# Patient Record
Sex: FEMALE | Race: BLACK OR AFRICAN AMERICAN | Employment: FULL TIME | URBAN - METROPOLITAN AREA
[De-identification: names, ages, dates, MRNs, and addresses within clinical notes are randomized per-mention and may not be internally consistent; named-entity substitution may affect disease eponyms.]

---

## 2021-06-25 PROBLEM — H47.10 OPTIC DISC EDEMA: Status: RESOLVED | Noted: 2019-05-30 | Resolved: 2021-06-25

## 2021-06-25 PROBLEM — B02.8 HERPES ZOSTER WITH COMPLICATION: Status: ACTIVE | Noted: 2019-03-05

## 2021-06-25 PROBLEM — B02.8 HERPES ZOSTER WITH COMPLICATION: Status: RESOLVED | Noted: 2019-03-05 | Resolved: 2021-06-25

## 2021-06-25 PROBLEM — H47.10 OPTIC DISC EDEMA: Status: ACTIVE | Noted: 2019-05-30

## 2022-02-08 PROBLEM — E61.1 IRON DEFICIENCY: Status: ACTIVE | Noted: 2022-02-08

## 2022-02-08 PROBLEM — E03.9 HYPOTHYROIDISM, UNSPECIFIED TYPE: Status: ACTIVE | Noted: 2022-02-08

## 2022-03-02 ENCOUNTER — LAB REQUISITION (OUTPATIENT)
Dept: LAB | Facility: HOSPITAL | Age: 32
End: 2022-03-02
Payer: COMMERCIAL

## 2022-03-02 PROCEDURE — 88305 TISSUE EXAM BY PATHOLOGIST: CPT | Performed by: OBSTETRICS & GYNECOLOGY

## 2023-10-12 ENCOUNTER — OFFICE VISIT (OUTPATIENT)
Dept: FAMILY MEDICINE CLINIC | Facility: CLINIC | Age: 33
End: 2023-10-12
Payer: COMMERCIAL

## 2023-10-12 VITALS
HEIGHT: 70 IN | TEMPERATURE: 98 F | WEIGHT: 186 LBS | DIASTOLIC BLOOD PRESSURE: 74 MMHG | HEART RATE: 85 BPM | BODY MASS INDEX: 26.63 KG/M2 | OXYGEN SATURATION: 97 % | SYSTOLIC BLOOD PRESSURE: 110 MMHG

## 2023-10-12 DIAGNOSIS — R82.998 DARK URINE: ICD-10-CM

## 2023-10-12 DIAGNOSIS — F41.9 ANXIETY: Primary | ICD-10-CM

## 2023-10-12 DIAGNOSIS — E61.1 IRON DEFICIENCY: ICD-10-CM

## 2023-10-12 DIAGNOSIS — Z11.59 NEED FOR HEPATITIS C SCREENING TEST: ICD-10-CM

## 2023-10-12 DIAGNOSIS — R53.83 OTHER FATIGUE: ICD-10-CM

## 2023-10-12 DIAGNOSIS — E03.8 OTHER SPECIFIED HYPOTHYROIDISM: ICD-10-CM

## 2023-10-12 PROBLEM — E03.9 HYPOTHYROIDISM: Status: ACTIVE | Noted: 2022-02-08

## 2023-10-12 PROBLEM — H47.10 OPTIC DISC EDEMA: Status: ACTIVE | Noted: 2019-05-30

## 2023-10-12 PROBLEM — N84.0 ENDOMETRIAL POLYP: Status: ACTIVE | Noted: 2023-10-12

## 2023-10-12 PROBLEM — B02.8 HERPES ZOSTER WITH COMPLICATION: Status: ACTIVE | Noted: 2019-03-05

## 2023-10-12 LAB
BILIRUB BLD-MCNC: NEGATIVE MG/DL
CLARITY, POC: CLEAR
COLOR UR: ABNORMAL
EXPIRATION DATE: ABNORMAL
GLUCOSE UR STRIP-MCNC: NEGATIVE MG/DL
KETONES UR QL: ABNORMAL
LEUKOCYTE EST, POC: NEGATIVE
Lab: ABNORMAL
NITRITE UR-MCNC: NEGATIVE MG/ML
PH UR: 7 [PH] (ref 5–8)
PROT UR STRIP-MCNC: ABNORMAL MG/DL
RBC # UR STRIP: NEGATIVE /UL
SP GR UR: 1.03 (ref 1–1.03)
UROBILINOGEN UR QL: ABNORMAL

## 2023-10-12 RX ORDER — FLUOXETINE HYDROCHLORIDE 20 MG/1
20 CAPSULE ORAL EVERY MORNING
Qty: 30 CAPSULE | Refills: 0 | Status: SHIPPED | OUTPATIENT
Start: 2023-10-12

## 2023-10-12 RX ORDER — DIPHENOXYLATE HYDROCHLORIDE AND ATROPINE SULFATE 2.5; .025 MG/1; MG/1
1 TABLET ORAL
COMMUNITY

## 2023-10-12 NOTE — ASSESSMENT & PLAN NOTE
- Not currently with SI/HI.    - Strongly advised starting counseling.    - Pharmacologic therapy: prozac 20mg every morning, discussed side effects, and advised continuing to use med if these develop as they will often resolve within 2 weeks.    - Provided information on nonpharmacologic coping strategies.  - Discussed going to ER immediately if SI/HI develop.   - To RTC in 4 weeks, sooner prn.

## 2023-10-12 NOTE — PATIENT INSTRUCTIONS
Today: Update screening labs    Meds: start prozac 20mg every morning    Referrals: none    Imaging: none    Healthy lifestyle tips  - Reduce intake of processed food (shop perimeter of grocery store), especially white flour and sugar  - Increase intake of fruits/veggies  - Drink 32-64oz of water a day  - Quit smoking if you smoke  - Drink no more than 2 alcoholic beverages/day if you are male, no more than 1 alcoholic beverage/day if you are female  - Get 6-8 hours of restful sleep at night- poor sleep quality has been linked to increased risk of cardiovascular disease  - Voluntary physical activity cumulative 120-150 minutes/week  - Stress management utilizing meditation and mindfulness (InsightTimer, free karthik)  - Keep blood pressure < 140/90    Heart healthy diet from AHA: https://www.heart.org/en/healthy-living/healthy-eating/eat-smart/nutrition-basics/aha-diet-and-lifestyle-recommendations    MH plan:  - Contact insurance for in network counseling  - Meditation: check out Insight Timer (free karthik)  - Sleep hygiene  - Increase physical activity as tolerated- goal 120mins/week  - Acupressure  - Supplements: magnesium, ashwaganda  - May make appt for Dr. Dumas's acupuncture clinic if desired- Tuesdays, $100 per visit  - If you experience any thoughts of harming yourself or someone else, please go to the ER immediately.     Resources:  https://www.apa.org/topics/anxiety/    https://www.apa.org/topics/depression/    https://sleepeducation.org/healthy-sleep/healthy-sleep-habits/    https://www.[facility].org/cancer-care/patient-education/acupressure-stress-and-anxiety    Sleep/fatigue plan:  - Keep a consistent sleep schedule. Get up at the same time every day, even on weekends or during vacations.  - Set a bedtime that is early enough for you to get at least 7-8 hours of sleep.  - Don't go to bed unless you are sleepy.  - If you don't fall asleep after 20 minutes, get out of bed. Go do a quiet activity without a lot of  light exposure. It is especially important to not get on electronics.  - Establish a relaxing bedtime routine.  - Use your bed only for sleep and sex.  - Make your bedroom quiet and relaxing. Keep the room at a comfortable, cool temperature.  - Limit exposure to bright light in the evenings.  - Turn off electronic devices at least 30 minutes before bedtime.  - Don't eat a large meal before bedtime. If you are hungry at night, eat a light, healthy snack.  - Exercise regularly and maintain a healthy diet.  - Avoid consuming caffeine in the afternoon or evening.  - Avoid consuming alcohol before bedtime.  - Reduce your fluid intake before bedtime.  https://sleepeducation.org/healthy-sleep/healthy-sleep-habits/

## 2023-10-12 NOTE — PROGRESS NOTES
Chief Complaint  Chief Complaint   Patient presents with    Establish Care       Subjective    History of Present Illness  Pb Castellanos is a 32 y.o. female presents to [facility] to establish care.  Occupation:  for [employer]  Hobbies: paint, reading, spend time with 10 yo, go on dates  Diet: Fruits and veggies this week  Physical activity: Jog sometimes, lifting weights  Support system: God  Sleep: Awful- trouble falling asleep and staying asleep x years. Feels like she can't turn her brain off. Can use meditation to fall asleep, but then will wake up overnight and can't go back to sleep. She has tried trazodone- worked initially, but then started to give her migraine.   Mood: positive person, but is also exhausted and feeling mentally drained  Health goals: Getting better sleep, eat better    Current Outpatient Medications on File Prior to Visit   Medication Sig Dispense Refill    multivitamin tablet tablet Take 1 tablet by mouth.       No current facility-administered medications on file prior to visit.       Past Medical History:   Diagnosis Date    Anemia     Hypothyroidism        Past Surgical History:   Procedure Laterality Date    CERVICAL CERCLAGE      D & C HYSTEROSCOPY      HYSTEROSCOPY W/ POLYPECTOMY         Family History   Problem Relation Age of Onset    Hypertension Mother     Drug abuse Mother 48             No Known Problems Father     Thyroid disease Maternal Aunt     Heart attack Maternal Great-Grandfather        Health Maintenance Summary            Overdue - BMI FOLLOWUP (Yearly) Never done      No completion, postpone, or frequency change history exists for this topic.              Ordered - HEPATITIS C SCREENING (Once) Ordered on 10/12/2023      No completion, postpone, or frequency change history exists for this topic.              Postponed - PAP SMEAR (Every 3 Years) Postponed until 2023      10/12/2023  Postponed  "until 12/4/2023 by Alisa Dumas MD (Pending event)              ANNUAL PHYSICAL (Yearly) Next due on 10/12/2024      10/12/2023  Done              TDAP/TD VACCINES (2 - Td or Tdap) Next due on 2/15/2029      02/15/2019  Imm Admin: Tdap              Pneumococcal Vaccine 0-64 (Series Information) Aged Out      No completion, postpone, or frequency change history exists for this topic.              Discontinued - COVID-19 Vaccine  Discontinued      10/12/2023  Frequency changed to Never by Alisa Dumas MD (patient never wants)              Discontinued - INFLUENZA VACCINE  Discontinued      10/12/2023  Frequency changed to Never by Alisa Dumas MD (pt never will get)                       Objective   Vitals:    10/12/23 1433   BP: 110/74   BP Location: Left arm   Patient Position: Sitting   Cuff Size: Large Adult   Pulse: 85   Temp: 98 øF (36.7 øC)   TempSrc: Oral   SpO2: 97%   Weight: 84.4 kg (186 lb)   Height: 177.8 cm (70\")        BMI is >= 25 and <30. (Overweight) The following options were offered after discussion;: exercise counseling/recommendations and nutrition counseling/recommendations       Physical Exam  Vitals reviewed.   Constitutional:       General: She is not in acute distress.     Appearance: Normal appearance.   HENT:      Head: Normocephalic and atraumatic.      Right Ear: Tympanic membrane, ear canal and external ear normal.      Left Ear: Tympanic membrane, ear canal and external ear normal.      Nose: Nose normal. No rhinorrhea.      Mouth/Throat:      Mouth: Mucous membranes are moist.      Pharynx: No posterior oropharyngeal erythema.      Comments: Enlarged tonsils bilaterally  Eyes:      Extraocular Movements: Extraocular movements intact.      Conjunctiva/sclera: Conjunctivae normal.      Pupils: Pupils are equal, round, and reactive to light.   Neck:      Comments: No thyromegaly or thyroid nodule on palpation  Cardiovascular:      Rate and Rhythm: Normal rate and regular rhythm.     "  Pulses: Normal pulses.      Heart sounds: Normal heart sounds. No murmur heard.  Pulmonary:      Effort: Pulmonary effort is normal.      Breath sounds: Normal breath sounds. No wheezing.   Abdominal:      General: Bowel sounds are normal. There is no distension.      Palpations: Abdomen is soft.      Tenderness: There is no abdominal tenderness.   Musculoskeletal:         General: No tenderness or deformity. Normal range of motion.      Cervical back: Normal range of motion.   Lymphadenopathy:      Cervical: No cervical adenopathy.   Skin:     General: Skin is warm and dry.      Capillary Refill: Capillary refill takes less than 2 seconds.      Findings: No lesion or rash.   Neurological:      General: No focal deficit present.      Mental Status: She is alert and oriented to person, place, and time.      Gait: Gait normal.      Deep Tendon Reflexes: Reflexes abnormal (patellar reflex 0-1+).   Psychiatric:         Mood and Affect: Mood normal.         Behavior: Behavior normal.         Judgment: Judgment normal.        Brief Urine Lab Results  (Last result in the past 365 days)        Color   Clarity   Blood   Leuk Est   Nitrite   Protein   CREAT   Urine HCG        10/12/23 1527 Dark Yellow   Clear   Negative   Negative   Negative   30 mg/dL                   PHQ-9 Depression Screening  Little interest or pleasure in doing things? 0-->not at all   Feeling down, depressed, or hopeless? 0-->not at all   Trouble falling or staying asleep, or sleeping too much?     Feeling tired or having little energy?     Poor appetite or overeating?     Feeling bad about yourself - or that you are a failure or have let yourself or your family down?     Trouble concentrating on things, such as reading the newspaper or watching television?     Moving or speaking so slowly that other people could have noticed? Or the opposite - being so fidgety or restless that you have been moving around a lot more than usual?     Thoughts that you  "would be better off dead, or of hurting yourself in some way?     PHQ-9 Total Score 0   If you checked off any problems, how difficult have these problems made it for you to do your work, take care of things at home, or get along with other people?       CURTIS-7 (General Anxiety Disorder-7) from [employer]  on 10/12/2023  ** All calculations should be rechecked by clinician prior to use **    RESULT SUMMARY:  19 points  Severe anxiety disorder.    Functionally, the patient finds it is "very difficult" to perform life tasks due to their symptoms.      INPUTS:  Feeling nervous, anxious, or on edge --> 3 = Nearly every day  Not being able to stop or control worrying --> 3 = Nearly every day  Worrying too much about different things --> 3 = Nearly every day  Trouble relaxing --> 3 = Nearly every day  Being so restless that it's hard to sit still --> 3 = Nearly every day  Becoming easily annoyed or irritable --> 1 = Several days  Feeling afraid as if something awful might happen --> 3 = Nearly every day  Ask the patient: how difficult have these problems made it to do work, take care of things at home, or get along with other people? --> 2 = Very difficult      The following data was reviewed by: Alisa Dumas MD on 10/12/2023:  Labs: 2022 iron, ferritin, B12, folate, CBC, TSH  Last PCP clinic note 2022    Assessment and Plan  Pb Castellanos is a 32 y.o. female presents to [facility] today to establish care, chart reviewed and updated, medications reviewed, labs reviewed, preventative med reviewed. Answered all questions at this time, pt expressed no further concerns today.     Preventative medicine:   Eye exam: Up to date.    Dental exam: Up to date.    BP: normal  Lipid Panel :   ordered     A1c:  ordered      Hep C screening: ordered  HIV Screen UTD - pt declined  STI screening: pt declined  Colon cancer screening : N/A- age 45  Lung cancer screening: N/A  Immunizations UTD: No- pt " declined flu and covid vaccination  Anxiety/depression screening: abnormal see plan of care below  Tobacco cessation counseling: current occasional hookah use    Women:   Pap: Not up to date.  Schedule in 4 weeks  Mammogram:  age 41    Osteoporosis Screen:  age 66      Diagnoses and all orders for this visit:    1. Anxiety (Primary)  Assessment & Plan:  - Not currently with SI/HI.    - Strongly advised starting counseling.    - Pharmacologic therapy: prozac 20mg every morning, discussed side effects, and advised continuing to use med if these develop as they will often resolve within 2 weeks.    - Provided information on nonpharmacologic coping strategies.  - Discussed going to ER immediately if SI/HI develop.   - To RTC in 4 weeks, sooner prn.      Orders:  -     TSH Rfx On Abnormal To Free T4  -     FLUoxetine (PROzac) 20 MG capsule; Take 1 capsule by mouth Every Morning.  Dispense: 30 capsule; Refill: 0    2. Other fatigue  Assessment & Plan:  - fatigue labs pending  - optimize anxiety control with pharmacotherapy  - encouraged sleep hygiene  - if normal labs, no improvement with anxiety control and sleep hygiene will ref to sleep med for eval    Orders:  -     CBC & Differential  -     Comprehensive Metabolic Panel  -     Lipid Panel  -     Hemoglobin A1c  -     Vitamin D,25-Hydroxy  -     Ferritin    3. Dark urine  Comments:  + ketones/protein on clinic UA. Kidney bloodwork pending.  Orders:  -     Comprehensive Metabolic Panel  -     Hemoglobin A1c  -     POC Urinalysis Dipstick, Automated  -     Cancel: Urine Culture - Urine, Urine, Clean Catch    4. Other specified hypothyroidism  Assessment & Plan:  - TSH pending, if abnormal will restart synthroid        Orders:  -     TSH Rfx On Abnormal To Free T4    5. Iron deficiency  Assessment & Plan:  Labs pending, last ferritin 19 in 2022    Orders:  -     CBC & Differential  -     Ferritin    6. Need for hepatitis C screening test  -     Hepatitis C  Antibody        Patient voiced understanding and agreement with plan of care and had no further questions or concerns at this time.     I spent 51 minutes on this encounter, including chart review of any relevant previous encounters, labs, and imaging;  >%50% of encounter spent face-to-face with the patient or coordinating care.    Alisa Dumas MD  Family Medicine  [facility]    Follow Up  Return in about 4 weeks (around 11/9/2023) for Recheck and pap.    Patient Instructions   Today: Update screening labs    Meds: start prozac 20mg every morning    Referrals: none    Imaging: none    Healthy lifestyle tips  - Reduce intake of processed food (shop perimeter of grocery store), especially white flour and sugar  - Increase intake of fruits/veggies  - Drink 32-64oz of water a day  - Quit smoking if you smoke  - Drink no more than 2 alcoholic beverages/day if you are male, no more than 1 alcoholic beverage/day if you are female  - Get 6-8 hours of restful sleep at night- poor sleep quality has been linked to increased risk of cardiovascular disease  - Voluntary physical activity cumulative 120-150 minutes/week  - Stress management utilizing meditation and mindfulness (InsightTimer, free karthik)  - Keep blood pressure < 140/90    Heart healthy diet from AHA: https://www.heart.org/en/healthy-living/healthy-eating/eat-smart/nutrition-basics/aha-diet-and-lifestyle-recommendations    MH plan:  - Contact insurance for in network counseling  - Meditation: check out Insight Timer (free karthik)  - Sleep hygiene  - Increase physical activity as tolerated- goal 120mins/week  - Acupressure  - Supplements: magnesium, ashwaganda  - May make appt for Dr. Dumas's acupuncture clinic if desired- Tuesdays, $100 per visit  - If you experience any thoughts of harming yourself or someone else, please go to the ER immediately.      Resources:  https://www.apa.org/topics/anxiety/    https://www.apa.org/topics/depression/    https://sleepeducation.org/healthy-sleep/healthy-sleep-habits/    https://www.[facility] [place].org/cancer-care/patient-education/acupressure-stress-and-anxiety    Sleep/fatigue plan:  - Keep a consistent sleep schedule. Get up at the same time every day, even on weekends or during vacations.  - Set a bedtime that is early enough for you to get at least 7-8 hours of sleep.  - Don't go to bed unless you are sleepy.  - If you don't fall asleep after 20 minutes, get out of bed. Go do a quiet activity without a lot of light exposure. It is especially important to not get on electronics.  - Establish a relaxing bedtime routine.  - Use your bed only for sleep and sex.  - Make your bedroom quiet and relaxing. Keep the room at a comfortable, cool temperature.  - Limit exposure to bright light in the evenings.  - Turn off electronic devices at least 30 minutes before bedtime.  - Don't eat a large meal before bedtime. If you are hungry at night, eat a light, healthy snack.  - Exercise regularly and maintain a healthy diet.  - Avoid consuming caffeine in the afternoon or evening.  - Avoid consuming alcohol before bedtime.  - Reduce your fluid intake before bedtime.  https://sleepeducation.org/healthy-sleep/healthy-sleep-habits/

## 2023-10-12 NOTE — ASSESSMENT & PLAN NOTE
- fatigue labs pending  - optimize anxiety control with pharmacotherapy  - encouraged sleep hygiene  - if normal labs, no improvement with anxiety control and sleep hygiene will ref to sleep med for eval

## 2023-10-13 ENCOUNTER — TELEPHONE (OUTPATIENT)
Dept: FAMILY MEDICINE CLINIC | Facility: CLINIC | Age: 33
End: 2023-10-13
Payer: COMMERCIAL

## 2023-10-13 ENCOUNTER — PATIENT MESSAGE (OUTPATIENT)
Dept: FAMILY MEDICINE CLINIC | Facility: CLINIC | Age: 33
End: 2023-10-13
Payer: COMMERCIAL

## 2023-10-13 DIAGNOSIS — E03.8 OTHER SPECIFIED HYPOTHYROIDISM: Primary | ICD-10-CM

## 2023-10-13 DIAGNOSIS — E03.8 OTHER SPECIFIED HYPOTHYROIDISM: ICD-10-CM

## 2023-10-13 LAB
25(OH)D3+25(OH)D2 SERPL-MCNC: 33.1 NG/ML (ref 30–100)
ALBUMIN SERPL-MCNC: 4.6 G/DL (ref 3.9–4.9)
ALBUMIN/GLOB SERPL: 1.4 {RATIO} (ref 1.2–2.2)
ALP SERPL-CCNC: 64 IU/L (ref 44–121)
ALT SERPL-CCNC: 11 IU/L (ref 0–32)
AST SERPL-CCNC: 26 IU/L (ref 0–40)
BASOPHILS # BLD AUTO: 0 X10E3/UL (ref 0–0.2)
BASOPHILS NFR BLD AUTO: 1 %
BILIRUB SERPL-MCNC: 0.4 MG/DL (ref 0–1.2)
BUN SERPL-MCNC: 8 MG/DL (ref 6–20)
BUN/CREAT SERPL: 8 (ref 9–23)
CALCIUM SERPL-MCNC: 9.7 MG/DL (ref 8.7–10.2)
CHLORIDE SERPL-SCNC: 99 MMOL/L (ref 96–106)
CHOLEST SERPL-MCNC: 225 MG/DL (ref 100–199)
CO2 SERPL-SCNC: 22 MMOL/L (ref 20–29)
CREAT SERPL-MCNC: 0.95 MG/DL (ref 0.57–1)
EGFRCR SERPLBLD CKD-EPI 2021: 82 ML/MIN/1.73
EOSINOPHIL # BLD AUTO: 0.1 X10E3/UL (ref 0–0.4)
EOSINOPHIL NFR BLD AUTO: 1 %
ERYTHROCYTE [DISTWIDTH] IN BLOOD BY AUTOMATED COUNT: 13.6 % (ref 11.7–15.4)
FERRITIN SERPL-MCNC: 17 NG/ML (ref 15–150)
GLOBULIN SER CALC-MCNC: 3.4 G/DL (ref 1.5–4.5)
GLUCOSE SERPL-MCNC: 78 MG/DL (ref 70–99)
HBA1C MFR BLD: 5.9 % (ref 4.8–5.6)
HCT VFR BLD AUTO: 33.9 % (ref 34–46.6)
HCV IGG SERPL QL IA: NON REACTIVE
HDLC SERPL-MCNC: 82 MG/DL
HGB BLD-MCNC: 11 G/DL (ref 11.1–15.9)
IMM GRANULOCYTES # BLD AUTO: 0 X10E3/UL (ref 0–0.1)
IMM GRANULOCYTES NFR BLD AUTO: 0 %
LDLC SERPL CALC-MCNC: 135 MG/DL (ref 0–99)
LYMPHOCYTES # BLD AUTO: 2.4 X10E3/UL (ref 0.7–3.1)
LYMPHOCYTES NFR BLD AUTO: 38 %
MCH RBC QN AUTO: 29.6 PG (ref 26.6–33)
MCHC RBC AUTO-ENTMCNC: 32.4 G/DL (ref 31.5–35.7)
MCV RBC AUTO: 91 FL (ref 79–97)
MONOCYTES # BLD AUTO: 0.6 X10E3/UL (ref 0.1–0.9)
MONOCYTES NFR BLD AUTO: 10 %
NEUTROPHILS # BLD AUTO: 3.1 X10E3/UL (ref 1.4–7)
NEUTROPHILS NFR BLD AUTO: 50 %
PLATELET # BLD AUTO: 254 X10E3/UL (ref 150–450)
POTASSIUM SERPL-SCNC: 4.2 MMOL/L (ref 3.5–5.2)
PROT SERPL-MCNC: 8 G/DL (ref 6–8.5)
RBC # BLD AUTO: 3.72 X10E6/UL (ref 3.77–5.28)
SODIUM SERPL-SCNC: 137 MMOL/L (ref 134–144)
T4 FREE SERPL-MCNC: 0.81 NG/DL (ref 0.82–1.77)
TRIGL SERPL-MCNC: 48 MG/DL (ref 0–149)
TSH SERPL DL<=0.005 MIU/L-ACNC: 16 UIU/ML (ref 0.45–4.5)
VLDLC SERPL CALC-MCNC: 8 MG/DL (ref 5–40)
WBC # BLD AUTO: 6.2 X10E3/UL (ref 3.4–10.8)

## 2023-10-13 RX ORDER — LEVOTHYROXINE SODIUM 88 UG/1
88 TABLET ORAL DAILY
Qty: 30 TABLET | Refills: 0 | Status: SHIPPED | OUTPATIENT
Start: 2023-10-13

## 2023-10-13 NOTE — PROGRESS NOTES
Hello!    Here are the results of your most recent labs:    Your Hep C antibody, Vitamin D, and Comprehensive Metabolic Panel was all normal.     Your iron level, CBC, Cholesterol, Hgb A1C, and Thyroid Function was abnormal.     You are still pretty anemic based on the low hemoglobin/hematocrit/RBC and ferritin at 17- goal ferritin is 50-75 minimum. I recommend oral iron supplementation for 12-16 weeks. You can get OTC iron 325mg (the bottle may say 65mg), any brand is ok, and take one every day. I recommend taking iron with a vitamin C containing beverage such as orange juice to enhance absorption. I fpossible, avoid dairy, Tums, and coffee/tea around when you take iron as they may decrease absorption. Common side effects of iron supplementation include some stomach upset and constipation. Be sure to drink enough water and eat enough fiber when taking iron, you may use miralax if needed for constipation. Your poop may be dark green while taking iron, that is normal. You should notice an improvement within 6-8 weeks of daily iron supplementation as it takes a bit for the body to rebuild the stores it needs. If you do not notice an improvement, please let me know and we can repeat labs.      Your cholesterol was elevated.  For diet and lifestyle intervention, I recommend decreasing intake of trans and saturated fats, and red meat.  Increase physical activity as tolerated.  You may try supplementing with omega-3, berberine, and/or flaxseed if desired.    Your A1c was elevated, it is in the prediabetic range.  For diet and lifestyle interventions I recommend decreasing carbohydrate intake to 100-150 g/day, reducing processed food, increasing fruit and vegetable intake, at least 120 minutes of physical activity per week as tolerated, and controlling blood pressure with a goal of less than 120/80.     I'd like to repeat your CBC, A1c, and lipid in 3 months to assess response to diet and lifestyle changes. I have ordered  those labs to be drawn at our [place] lab at your convenience.    Your thyroid function was not normal, I think it would be good to restart your thyroid medicine. I'd like to start at 88mcg and then repeat your TSH in 4 weeks to assess response, will order at your followup appointment.. Be sure to take your thyroid med first thing in the morning on an empty stomach with a full glass of water. Wait 30-60 minutes before ingesting anything else to maximize absorption of the thyroid medicine.     Please contact me with any questions.    Thank you!  Dr. Dumas

## 2023-10-13 NOTE — TELEPHONE ENCOUNTER
Caller: Pb Castellanos    Relationship: Self    Best call back number: [phone]    What is the best time to reach you: ANYTIME     Who are you requesting to speak with (clinical staff, provider,  specific staff member): CLINICAL     What was the call regarding: PATIENT STATES THE PHARMACY HAS CALLED HER TWICE TODAY IN REGARDS TO NEEDING A PRIOR AUTHORIZATION FOR THE LEVOTHYROXINE.     PATIENT STATES SHE IS WANTING TO KNOW IF THERE IS AN UPDATE ON THE AUTHORIZATION.     PATIENT IS REQUESTING A CALL BACK.

## 2023-10-16 RX ORDER — FERROUS SULFATE 325(65) MG
325 TABLET ORAL
Qty: 90 TABLET | Refills: 1 | Status: SHIPPED | OUTPATIENT
Start: 2023-10-16

## 2023-10-16 NOTE — TELEPHONE ENCOUNTER
Luisito Mendoza 10/16/2023 9:53 AM EDT      ----- Message -----  From: Pb Castellanos  Sent: 10/16/2023 9:52 AM EDT  To: [facility]  Subject: Test results     Hey can you send me in a RX for iron please?

## 2023-11-12 DIAGNOSIS — E03.8 OTHER SPECIFIED HYPOTHYROIDISM: ICD-10-CM

## 2023-11-14 DIAGNOSIS — E03.8 OTHER SPECIFIED HYPOTHYROIDISM: ICD-10-CM

## 2023-11-14 DIAGNOSIS — E03.8 OTHER SPECIFIED HYPOTHYROIDISM: Primary | ICD-10-CM

## 2023-11-14 RX ORDER — LEVOTHYROXINE SODIUM 88 UG/1
TABLET ORAL
Qty: 90 TABLET | OUTPATIENT
Start: 2023-11-14

## 2023-11-14 RX ORDER — LEVOTHYROXINE SODIUM 88 UG/1
88 TABLET ORAL DAILY
Qty: 30 TABLET | Refills: 0 | Status: SHIPPED | OUTPATIENT
Start: 2023-11-14 | End: 2023-11-15

## 2023-11-14 NOTE — TELEPHONE ENCOUNTER
Rx Refill Note  Requested Prescriptions     Pending Prescriptions Disp Refills    levothyroxine (Synthroid) 88 MCG tablet 30 tablet 0     Sig: Take 1 tablet by mouth Daily. Take first thing in morning on an empty stomach with a full glass of water, wait 30-60 minutes before ingesting anything else to maximize medication absorption  Indications: Underactive Thyroid      Last office visit with prescribing clinician: 10/12/2023   Last telemedicine visit with prescribing clinician: Visit date not found   Next office visit with prescribing clinician: Visit date not found     Katerine Strong MA  11/14/23, 13:09 EST

## 2023-11-15 RX ORDER — LEVOTHYROXINE SODIUM 88 UG/1
TABLET ORAL
Qty: 90 TABLET | Refills: 0 | Status: SHIPPED | OUTPATIENT
Start: 2023-11-15

## 2023-11-18 DIAGNOSIS — F41.9 ANXIETY: ICD-10-CM

## 2023-11-20 RX ORDER — FLUOXETINE HYDROCHLORIDE 20 MG/1
20 CAPSULE ORAL EVERY MORNING
Qty: 30 CAPSULE | Refills: 0 | Status: SHIPPED | OUTPATIENT
Start: 2023-11-20

## 2023-12-18 DIAGNOSIS — E03.8 OTHER SPECIFIED HYPOTHYROIDISM: ICD-10-CM

## 2023-12-18 RX ORDER — LEVOTHYROXINE SODIUM 88 UG/1
88 TABLET ORAL
Qty: 90 TABLET | Refills: 0 | Status: SHIPPED | OUTPATIENT
Start: 2023-12-18

## 2023-12-18 NOTE — TELEPHONE ENCOUNTER
Rx Refill Note  Requested Prescriptions     Pending Prescriptions Disp Refills    levothyroxine (SYNTHROID, LEVOTHROID) 88 MCG tablet 90 tablet 0      Last office visit with prescribing clinician: 10/12/2023   Last telemedicine visit with prescribing clinician: Visit date not found   Next office visit with prescribing clinician: Visit date not found     Katerine Strong MA  12/18/23, 13:11 EST

## 2023-12-18 NOTE — TELEPHONE ENCOUNTER
Caller: CastellanosPb    Relationship: Self    Best call back number: [phone]     Requested Prescriptions:   Requested Prescriptions     Pending Prescriptions Disp Refills    levothyroxine (SYNTHROID, LEVOTHROID) 88 MCG tablet 90 tablet 0        Pharmacy where request should be sent: [id number] [id number] AT [facility] [phone] [facility] [phone]      Last office visit with prescribing clinician: 10/12/2023   Last telemedicine visit with prescribing clinician: Visit date not found   Next office visit with prescribing clinician: Visit date not found     Additional details provided by patient: PATIENT STATED THAT SHE HAS ONE LEFT. PATIENT STATED THAT SHE WILL SCHEDULE AN APPOINTMENT FOR BLOOD WORK IN JANUARY AND IS REQUESTING ONE MORE REFILL TO LAST UNTIL THEN SO SHE IS NOT WITHOUT MEDICATION. PLEASE ADVISE.     Does the patient have less than a 3 day supply:  [x] Yes  [] No    Would you like a call back once the refill request has been completed: [] Yes [x] No    If the office needs to give you a call back, can they leave a voicemail: [] Yes [x] No    Marty Solorio Rep   12/18/23 13:09 EST

## 2023-12-28 DIAGNOSIS — E61.1 IRON DEFICIENCY: ICD-10-CM

## 2023-12-28 DIAGNOSIS — E03.9 ACQUIRED HYPOTHYROIDISM: Primary | ICD-10-CM

## 2023-12-28 DIAGNOSIS — R80.9 PROTEINURIA, UNSPECIFIED TYPE: ICD-10-CM

## 2023-12-28 DIAGNOSIS — R74.9 ABNORMAL SERUM ENZYME LEVEL: ICD-10-CM

## 2023-12-29 ENCOUNTER — LAB (OUTPATIENT)
Dept: LAB | Facility: HOSPITAL | Age: 33
End: 2023-12-29
Payer: COMMERCIAL

## 2023-12-29 DIAGNOSIS — R80.9 PROTEINURIA, UNSPECIFIED TYPE: ICD-10-CM

## 2023-12-29 DIAGNOSIS — N76.0 RECURRENT VAGINITIS: Primary | ICD-10-CM

## 2023-12-29 DIAGNOSIS — E61.1 IRON DEFICIENCY: ICD-10-CM

## 2023-12-29 DIAGNOSIS — N76.0 BACTERIAL VAGINOSIS: Primary | ICD-10-CM

## 2023-12-29 DIAGNOSIS — E03.9 ACQUIRED HYPOTHYROIDISM: ICD-10-CM

## 2023-12-29 DIAGNOSIS — E03.8 OTHER SPECIFIED HYPOTHYROIDISM: ICD-10-CM

## 2023-12-29 DIAGNOSIS — R74.9 ABNORMAL SERUM ENZYME LEVEL: ICD-10-CM

## 2023-12-29 DIAGNOSIS — B96.89 BACTERIAL VAGINOSIS: Primary | ICD-10-CM

## 2023-12-29 LAB
ALBUMIN SERPL-MCNC: 4.1 G/DL (ref 3.5–5.2)
ALBUMIN UR-MCNC: <1.2 MG/DL
ALBUMIN/GLOB SERPL: 1.2 G/DL
ALP SERPL-CCNC: 58 U/L (ref 39–117)
ALT SERPL W P-5'-P-CCNC: 8 U/L (ref 1–33)
ANION GAP SERPL CALCULATED.3IONS-SCNC: 8.6 MMOL/L (ref 5–15)
AST SERPL-CCNC: 17 U/L (ref 1–32)
BACTERIA UR QL AUTO: NORMAL /HPF
BASOPHILS # BLD AUTO: 0.02 10*3/MM3 (ref 0–0.2)
BASOPHILS NFR BLD AUTO: 0.3 % (ref 0–1.5)
BILIRUB SERPL-MCNC: 0.3 MG/DL (ref 0–1.2)
BILIRUB UR QL STRIP: NEGATIVE
BUN SERPL-MCNC: 8 MG/DL (ref 6–20)
BUN/CREAT SERPL: 9.2 (ref 7–25)
CALCIUM SPEC-SCNC: 9.2 MG/DL (ref 8.6–10.5)
CHLORIDE SERPL-SCNC: 102 MMOL/L (ref 98–107)
CHOLEST SERPL-MCNC: 190 MG/DL (ref 0–200)
CLARITY UR: CLEAR
CO2 SERPL-SCNC: 25.4 MMOL/L (ref 22–29)
COLOR UR: YELLOW
CREAT SERPL-MCNC: 0.87 MG/DL (ref 0.57–1)
CREAT UR-MCNC: 178.4 MG/DL
DEPRECATED RDW RBC AUTO: 51.8 FL (ref 37–54)
EGFRCR SERPLBLD CKD-EPI 2021: 90.9 ML/MIN/1.73
EOSINOPHIL # BLD AUTO: 0.21 10*3/MM3 (ref 0–0.4)
EOSINOPHIL NFR BLD AUTO: 3.2 % (ref 0.3–6.2)
ERYTHROCYTE [DISTWIDTH] IN BLOOD BY AUTOMATED COUNT: 14.8 % (ref 12.3–15.4)
FERRITIN SERPL-MCNC: 42.1 NG/ML (ref 13–150)
GLOBULIN UR ELPH-MCNC: 3.4 GM/DL
GLUCOSE SERPL-MCNC: 93 MG/DL (ref 65–99)
GLUCOSE UR STRIP-MCNC: NEGATIVE MG/DL
HBA1C MFR BLD: 5.4 % (ref 4.8–5.6)
HCT VFR BLD AUTO: 35.7 % (ref 34–46.6)
HDLC SERPL-MCNC: 59 MG/DL (ref 40–60)
HGB BLD-MCNC: 11.3 G/DL (ref 12–15.9)
HGB UR QL STRIP.AUTO: NEGATIVE
HYALINE CASTS UR QL AUTO: NORMAL /LPF
IMM GRANULOCYTES # BLD AUTO: 0.01 10*3/MM3 (ref 0–0.05)
IMM GRANULOCYTES NFR BLD AUTO: 0.2 % (ref 0–0.5)
KETONES UR QL STRIP: NEGATIVE
LDLC SERPL CALC-MCNC: 116 MG/DL (ref 0–100)
LDLC/HDLC SERPL: 1.95 {RATIO}
LEUKOCYTE ESTERASE UR QL STRIP.AUTO: NEGATIVE
LYMPHOCYTES # BLD AUTO: 2.42 10*3/MM3 (ref 0.7–3.1)
LYMPHOCYTES NFR BLD AUTO: 36.9 % (ref 19.6–45.3)
MCH RBC QN AUTO: 30.1 PG (ref 26.6–33)
MCHC RBC AUTO-ENTMCNC: 31.7 G/DL (ref 31.5–35.7)
MCV RBC AUTO: 95.2 FL (ref 79–97)
MICROALBUMIN/CREAT UR: NORMAL MG/G{CREAT}
MONOCYTES # BLD AUTO: 0.52 10*3/MM3 (ref 0.1–0.9)
MONOCYTES NFR BLD AUTO: 7.9 % (ref 5–12)
NEUTROPHILS NFR BLD AUTO: 3.37 10*3/MM3 (ref 1.7–7)
NEUTROPHILS NFR BLD AUTO: 51.5 % (ref 42.7–76)
NITRITE UR QL STRIP: NEGATIVE
NRBC BLD AUTO-RTO: 0 /100 WBC (ref 0–0.2)
PH UR STRIP.AUTO: 5.5 [PH] (ref 5–8)
PLATELET # BLD AUTO: 260 10*3/MM3 (ref 140–450)
PMV BLD AUTO: 9.4 FL (ref 6–12)
POTASSIUM SERPL-SCNC: 3.8 MMOL/L (ref 3.5–5.2)
PROT SERPL-MCNC: 7.5 G/DL (ref 6–8.5)
PROT UR QL STRIP: NEGATIVE
RBC # BLD AUTO: 3.75 10*6/MM3 (ref 3.77–5.28)
RBC # UR STRIP: NORMAL /HPF
REF LAB TEST METHOD: NORMAL
SODIUM SERPL-SCNC: 136 MMOL/L (ref 136–145)
SP GR UR STRIP: 1.02 (ref 1–1.03)
SQUAMOUS #/AREA URNS HPF: NORMAL /HPF
TRIGL SERPL-MCNC: 81 MG/DL (ref 0–150)
TSH SERPL DL<=0.05 MIU/L-ACNC: 2.18 UIU/ML (ref 0.27–4.2)
UROBILINOGEN UR QL STRIP: NORMAL
VLDLC SERPL-MCNC: 15 MG/DL (ref 5–40)
WBC # UR STRIP: NORMAL /HPF
WBC NRBC COR # BLD AUTO: 6.55 10*3/MM3 (ref 3.4–10.8)

## 2023-12-29 PROCEDURE — 80053 COMPREHEN METABOLIC PANEL: CPT

## 2023-12-29 PROCEDURE — 82728 ASSAY OF FERRITIN: CPT

## 2023-12-29 PROCEDURE — 81001 URINALYSIS AUTO W/SCOPE: CPT

## 2023-12-29 PROCEDURE — 82043 UR ALBUMIN QUANTITATIVE: CPT

## 2023-12-29 PROCEDURE — 85025 COMPLETE CBC W/AUTO DIFF WBC: CPT

## 2023-12-29 PROCEDURE — 83036 HEMOGLOBIN GLYCOSYLATED A1C: CPT

## 2023-12-29 PROCEDURE — 84443 ASSAY THYROID STIM HORMONE: CPT

## 2023-12-29 PROCEDURE — 82570 ASSAY OF URINE CREATININE: CPT

## 2023-12-29 PROCEDURE — 80061 LIPID PANEL: CPT

## 2023-12-29 PROCEDURE — 36415 COLL VENOUS BLD VENIPUNCTURE: CPT

## 2023-12-29 RX ORDER — CLINDAMYCIN PHOSPHATE 20 MG/G
1 CREAM VAGINAL NIGHTLY
Qty: 5 G | Refills: 0 | Status: SHIPPED | OUTPATIENT
Start: 2023-12-29 | End: 2024-01-03

## 2023-12-29 NOTE — PROGRESS NOTES
Hello!    Here are the results of your most recent labs:    Your Comprehensive Metabolic Panel, Hgb A1C, TSH, and Urinalysis was all normal.     Your CBC is still consistent with anemia, however it is improving.  Your ferritin is still below goal of 50, however it is also improving.  Continue iron supplementation.    Your cholesterol was elevated.  For diet and lifestyle intervention, I recommend decreasing intake of trans and saturated fats, and red meat.  Increase physical activity as tolerated.  You may try supplementing with omega-3 1-2g daily, berberine 500mg daily, and/or whole flaxseed if desired.    Please continue your current medications.  Please contact me with any questions.    Thank you!  Dr. Dumas

## 2024-01-04 RX ORDER — METRONIDAZOLE 7.5 MG/G
GEL VAGINAL TAKE AS DIRECTED
Qty: 70 G | Refills: 1 | Status: SHIPPED | OUTPATIENT
Start: 2024-01-04

## 2024-01-17 DIAGNOSIS — E03.8 OTHER SPECIFIED HYPOTHYROIDISM: ICD-10-CM

## 2024-01-18 RX ORDER — LEVOTHYROXINE SODIUM 88 UG/1
88 TABLET ORAL
Qty: 90 TABLET | Refills: 3 | Status: SHIPPED | OUTPATIENT
Start: 2024-01-18

## 2024-02-26 DIAGNOSIS — B96.89 BACTERIAL VAGINOSIS: Primary | ICD-10-CM

## 2024-02-26 DIAGNOSIS — N76.0 BACTERIAL VAGINOSIS: Primary | ICD-10-CM

## 2024-02-26 RX ORDER — METRONIDAZOLE 7.5 MG/G
GEL VAGINAL TAKE AS DIRECTED
Qty: 70 G | Refills: 1 | Status: SHIPPED | OUTPATIENT
Start: 2024-02-26

## 2024-02-26 RX ORDER — METRONIDAZOLE 500 MG/1
500 TABLET ORAL 2 TIMES DAILY
Qty: 14 TABLET | Refills: 0 | Status: SHIPPED | OUTPATIENT
Start: 2024-02-26 | End: 2024-03-04

## 2024-03-23 DIAGNOSIS — E03.8 OTHER SPECIFIED HYPOTHYROIDISM: ICD-10-CM

## 2024-03-25 RX ORDER — LEVOTHYROXINE SODIUM 88 UG/1
88 TABLET ORAL
Qty: 90 TABLET | Refills: 3 | Status: SHIPPED | OUTPATIENT
Start: 2024-03-25

## 2024-08-23 ENCOUNTER — OFFICE VISIT (OUTPATIENT)
Dept: FAMILY MEDICINE CLINIC | Facility: CLINIC | Age: 34
End: 2024-08-23
Payer: COMMERCIAL

## 2024-08-23 VITALS
SYSTOLIC BLOOD PRESSURE: 116 MMHG | HEART RATE: 63 BPM | HEIGHT: 70 IN | WEIGHT: 178 LBS | DIASTOLIC BLOOD PRESSURE: 70 MMHG | OXYGEN SATURATION: 96 % | BODY MASS INDEX: 25.48 KG/M2

## 2024-08-23 DIAGNOSIS — E03.9 ACQUIRED HYPOTHYROIDISM: Primary | ICD-10-CM

## 2024-08-23 DIAGNOSIS — Z11.3 SCREENING EXAMINATION FOR STI: ICD-10-CM

## 2024-08-23 DIAGNOSIS — N75.0 CYST OF LEFT BARTHOLIN'S GLAND: ICD-10-CM

## 2024-08-23 DIAGNOSIS — E61.1 IRON DEFICIENCY: ICD-10-CM

## 2024-08-23 DIAGNOSIS — N76.0 RECURRENT VAGINITIS: ICD-10-CM

## 2024-08-23 DIAGNOSIS — Z00.00 ANNUAL PHYSICAL EXAM: ICD-10-CM

## 2024-08-23 DIAGNOSIS — E78.2 MODERATE MIXED HYPERLIPIDEMIA NOT REQUIRING STATIN THERAPY: ICD-10-CM

## 2024-08-23 DIAGNOSIS — Z01.419 ENCOUNTER FOR ROUTINE GYNECOLOGICAL EXAMINATION WITH PAPANICOLAOU SMEAR OF CERVIX: ICD-10-CM

## 2024-08-23 NOTE — PATIENT INSTRUCTIONS
Today: Update screening labs. If pap normal- next due in 5 years. Trapezius exercises: https://[email or web address]/[email or web address]/06/best-exercises-for-the-trapezius-muscle/    Meds: None right now    Healthy lifestyle tips  - Reduce intake of processed food (shop perimeter of grocery store), especially white flour and sugar  - Increase intake of fruits/veggies  - Drink 32-64oz of water a day  - Drink no more than 2 alcoholic beverages/day if you are male, no more than 1 alcoholic beverage/day if you are female  - Get 6-8 hours of restful sleep at night- poor sleep quality has been linked to increased risk of cardiovascular disease  - Voluntary physical activity cumulative 120-150 minutes/week  - Keep blood pressure < 140/90  - Use a barrier protection when having sex with new partner, condom or dental dam: https://www.cdc.gov/condom-use/resources/dental-dam.html    Heart healthy diet from AHA: https://www.heart.org/en/healthy-living/healthy-eating/eat-smart/nutrition-basics/aha-diet-and-lifestyle-recommendations      MH plan:  - Start counseling if not currently established  - Meditation: check out Insight Timer (free karthik)  - Sleep hygiene  - Increase physical activity as tolerated- goal 120mins/week  - Acupressure  - Supplements: magnesium, ashwaganda  - May make appt for Dr. Dumas's acupuncture clinic if desired- Tuesdays, cash pay  - If you experience any thoughts of harming yourself or someone else, please go to the ER immediately.     Resources:  https://www.apa.org/topics/anxiety/    https://www.apa.org/topics/depression/    https://sleepeducation.org/healthy-sleep/healthy-sleep-habits/    https://www.[facility].org/cancer-care/patient-education/acupressure-stress-and-anxiety    Sleep/fatigue plan:  - CBT-I is the most effective treatment for trouble sleeping according to research. You can try it for free at your own pace using this free karthik from [facility]:  https://[employer]/karthik/cbt-i-#:~:text=CBT%2Di%20Coach%20is%20based,Center%20for%20Telehealth%20and%20Technology.  - Keep a consistent sleep schedule. Get up at the same time every day, even on weekends or during vacations.  - Set a bedtime that is early enough for you to get at least 7-8 hours of sleep.  - Don’t go to bed unless you are sleepy.  - If you don’t fall asleep after 20 minutes, get out of bed. Go do a quiet activity without a lot of light exposure. It is especially important to not get on electronics.  - Establish a relaxing bedtime routine.  - Use your bed only for sleep and sex.  - Make your bedroom quiet and relaxing. Keep the room at a comfortable, cool temperature.  - Limit exposure to bright light in the evenings.  - Turn off electronic devices at least 30 minutes before bedtime.  - Don’t eat a large meal before bedtime. If you are hungry at night, eat a light, healthy snack.  - Exercise regularly and maintain a healthy diet.  - Avoid consuming caffeine in the afternoon or evening.  - Avoid consuming alcohol before bedtime.  - Reduce your fluid intake before bedtime.  https://sleepeducation.org/healthy-sleep/healthy-sleep-habits/

## 2024-08-23 NOTE — PROGRESS NOTES
Chief Complaint  Chief Complaint   Patient presents with    Annual Exam     pap    Anxiety    Hypothyroidism    Gynecologic Exam       Subjective    History of Present Illness  Pb Castellanos is a 33 y.o. female presents to [facility] for annual exam and well woman  History of Present Illness  She has a demanding work schedule, with two jobs that keep her occupied from 8:00 AM to 11:00 PM on weekdays and from 8:00 AM to 5:00 PM on Saturdays. She reports that her anxiety is not well-managed. She is experiencing emotional distress due to her divorce and has been crying frequently. She took Prozac for three days but discontinued it due to concerns about dependency. She prefers to address the root cause of her anxiety rather than relying on medication. She is not currently on any medications, opting for herbal pills instead of thyroid medication. She has made dietary changes, including eliminating meat and adopting healthier eating habits, resulting in a weight loss of 20 pounds, from 198 to 178. She has started lifting weights at home for exercise. Her sleep quality has improved, although she still wakes up before 4:00 AM.     She has noticed that her urine is consistently dark, even though she drinks water throughout the day. She rarely consumes soda. She practices intermittent fasting and does not eat until 6:00 PM on some days.     She has not had any new sexual partners recently. She has not been sexually active recently, but when she was, she did not experience any pain or bleeding during intercourse. She has noticed an increase in vaginal discharge, which she attributes to being mid-cycle. She reports no burning sensation during urination or pelvic pain. She has a lump that has not increased in size but has started to itch and cause mild pain. She has not needed MetroGel for bacterial vaginosis (BV) recently and admits to not completing her medication course for BV, only taking it  when symptoms persist. Se had an abnormal Pap smear approximately two years ago.    She has had dental and eye check-ups within the past year.       Current Outpatient Medications on File Prior to Visit   Medication Sig Dispense Refill    [DISCONTINUED] ferrous sulfate 325 (65 FE) MG tablet Take 1 tablet by mouth Daily With Breakfast. Indications: Anemia From Inadequate Iron in the Body (Patient not taking: Reported on 2024) 90 tablet 1    [DISCONTINUED] FLUoxetine (PROzac) 20 MG capsule TAKE 1 CAPSULE BY MOUTH EVERY MORNING (Patient not taking: Reported on 2024) 30 capsule 0    [DISCONTINUED] levothyroxine (SYNTHROID, LEVOTHROID) 88 MCG tablet TAKE 1 TABLET BY MOUTH EVERY MORNING (Patient not taking: Reported on 2024) 90 tablet 3    [DISCONTINUED] metroNIDAZOLE (METROGEL) 0.75 % vaginal gel Insert  into the vagina Take As Directed. For suppressive therapy twice weekly apply one applicatorful (5 g containing ~37.5 mg metronidazole) in vagina at bedtime for 3 to 6 month  Indications: Vaginosis caused by Bacteria (Patient not taking: Reported on 2024) 70 g 1    [DISCONTINUED] multivitamin tablet tablet Take 1 tablet by mouth. (Patient not taking: Reported on 2024)       No current facility-administered medications on file prior to visit.       Patient Active Problem List   Diagnosis    Herpes zoster with complication    Hypothyroidism    Iron deficiency    Optic disc edema    Endometrial polyp    Anxiety    Other fatigue       Past Medical History:   Diagnosis Date    Anemia     Hypothyroidism        Past Surgical History:   Procedure Laterality Date    CERVICAL CERCLAGE      D & C HYSTEROSCOPY      HYSTEROSCOPY W/ POLYPECTOMY         Family History   Problem Relation Age of Onset    Hypertension Mother     Drug abuse Mother 48             No Known Problems Father     Thyroid disease Maternal Aunt     Heart attack Maternal Great-Grandfather        Health Maintenance Summary  "           Ordered - PAP SMEAR (Every 3 Years) Ordered on 8/23/2024      10/12/2023  Postponed until 12/4/2023 by Alisa Dumas MD (Pending event)              Postponed - Pneumococcal Vaccine 0-64 (1 of 2 - PCV) Postponed until 8/23/2025 08/23/2024  Postponed until 8/23/2025 by Alisa Dumas MD (Patient Refused)              ANNUAL PHYSICAL (Yearly) Next due on 10/12/2024      10/12/2023  Done              BMI FOLLOWUP (Yearly) Next due on 10/12/2024      10/12/2023  SmartData: BMI EDUCATION FOR OVERWEIGHT    10/12/2023  SmartData: WORKFLOW - QUALITY MEASUREMENT - DOCUMENTED WEIGHT FOLLOW-UP PLAN              Ordered - LIPID PANEL (Yearly) Ordered on 8/23/2024 12/29/2023  Lipid Panel    10/12/2023  Lipid Panel    06/25/2021  LIPID PANEL              TDAP/TD VACCINES (2 - Td or Tdap) Next due on 2/15/2029      02/15/2019  Imm Admin: Tdap              HEPATITIS C SCREENING  Ordered on 8/23/2024      10/12/2023  Hep C Virus Ab component of Hepatitis C Antibody              Discontinued - COVID-19 Vaccine  Discontinued      10/12/2023  Frequency changed to Never by Alisa Dumas MD (patient never wants)              Discontinued - INFLUENZA VACCINE  Discontinued      10/12/2023  Frequency changed to Never by Alisa Dumas MD (pt never will get)                       Objective   Vitals:    08/23/24 1411   BP: 116/70   Pulse: 63   SpO2: 96%   Weight: 80.7 kg (178 lb)   Height: 177.8 cm (70\")                Physical Exam  Vitals reviewed. Exam conducted with a chaperone present.   Constitutional:       General: She is not in acute distress.     Appearance: Normal appearance.   HENT:      Head: Normocephalic and atraumatic.      Right Ear: Tympanic membrane, ear canal and external ear normal.      Left Ear: Tympanic membrane, ear canal and external ear normal.      Nose: Nose normal. No rhinorrhea.      Mouth/Throat:      Mouth: Mucous membranes are moist.      Pharynx: No posterior oropharyngeal erythema. "   Eyes:      Extraocular Movements: Extraocular movements intact.      Conjunctiva/sclera: Conjunctivae normal.      Pupils: Pupils are equal, round, and reactive to light.   Neck:      Comments: No thyromegaly or thyroid nodule on palpation  Cardiovascular:      Rate and Rhythm: Normal rate and regular rhythm.      Pulses: Normal pulses.      Heart sounds: Normal heart sounds. No murmur heard.  Pulmonary:      Effort: Pulmonary effort is normal.      Breath sounds: Normal breath sounds. No wheezing.   Abdominal:      General: Bowel sounds are normal. There is no distension.      Palpations: Abdomen is soft.      Tenderness: There is no abdominal tenderness.   Genitourinary:     Labia:         Left: No tenderness.       Vagina: Vaginal discharge (Thin white/green discharge) present. No tenderness.      Cervix: Normal. No discharge, friability or erythema.      Comments: Nontender subcutaneous cyst at around 5:00 left inferior labia consistent with Bartholin gland cyst  Musculoskeletal:         General: No tenderness or deformity. Normal range of motion.      Cervical back: Normal range of motion and neck supple.   Lymphadenopathy:      Cervical: No cervical adenopathy.   Skin:     General: Skin is warm and dry.      Capillary Refill: Capillary refill takes less than 2 seconds.      Findings: No lesion or rash.   Neurological:      General: No focal deficit present.      Mental Status: She is alert and oriented to person, place, and time.      Gait: Gait normal.      Deep Tendon Reflexes: Reflexes normal.   Psychiatric:         Mood and Affect: Mood normal.         Behavior: Behavior normal.         Judgment: Judgment normal.          PHQ-9 Depression Screening  Little interest or pleasure in doing things? 0-->not at all   Feeling down, depressed, or hopeless? 0-->not at all   Trouble falling or staying asleep, or sleeping too much?     Feeling tired or having little energy?     Poor appetite or overeating?      Feeling bad about yourself - or that you are a failure or have let yourself or your family down?     Trouble concentrating on things, such as reading the newspaper or watching television?     Moving or speaking so slowly that other people could have noticed? Or the opposite - being so fidgety or restless that you have been moving around a lot more than usual?     Thoughts that you would be better off dead, or of hurting yourself in some way?     PHQ-9 Total Score 0   If you checked off any problems, how difficult have these problems made it for you to do your work, take care of things at home, or get along with other people?       The following data was reviewed by: Alisa Dumas MD on 08/23/2024:  CMP          10/12/2023    00:00 12/29/2023    08:01   CMP   Glucose 78  93    BUN 8  8    Creatinine 0.95  0.87    EGFR  90.9    Sodium 137  136    Potassium 4.2  3.8    Chloride 99  102    Calcium 9.7  9.2    Total Protein 8.0     Total Protein  7.5    Albumin 4.6  4.1    Globulin 3.4     Globulin  3.4    Total Bilirubin 0.4  0.3    Alkaline Phosphatase 64  58    AST (SGOT) 26  17    ALT (SGPT) 11  8    Albumin/Globulin Ratio  1.2    BUN/Creatinine Ratio 8  9.2    Anion Gap  8.6      CBC          10/12/2023    00:00 12/29/2023    08:01   CBC   WBC 6.2  6.55    RBC 3.72  3.75    Hemoglobin 11.0  11.3    Hematocrit 33.9  35.7    MCV 91  95.2    MCH 29.6  30.1    MCHC 32.4  31.7    RDW 13.6  14.8    Platelets 254  260      Lipid Panel          10/12/2023    00:00 12/29/2023    08:01   Lipid Panel   Total Cholesterol  190    Total Cholesterol 225     Triglycerides 48  81    HDL Cholesterol 82  59    VLDL Cholesterol 8  15    LDL Cholesterol  135  116    LDL/HDL Ratio  1.95      TSH          10/12/2023    00:00 12/29/2023    08:01   TSH   TSH 16.000  2.180      Most Recent A1C          12/29/2023    08:01   HGBA1C Most Recent   Hemoglobin A1C 5.40      Last PCP note    Assessment and Plan  Pb Castellanos is a 33 y.o.  female presents to [facility] today for annual exam and to discuss health goals/concerns, chart reviewed and updated, medications reviewed, labs reviewed, preventative med reviewed. Answered all questions at this time, pt expressed no further concerns today.     Preventative medicine:   Eye exam: Up to date.    Dental exam: Up to date.    BP: normal  Lipid Panel :   ordered    A1c:  ordered    Hep C screening: Negative  Colon cancer screening UTD- age 45-75: N/A  Lung cancer screening UTD- age 50-75: N/A  Immunizations UTD: No-patient declined pneumonia vaccine  Anxiety/depression screening: normal  Tobacco cessation counseling: N/A    Women:   Pap age 21-65: Up to date.  If normal today, next due in 3 years per ASCCP guidelines.  Mammogram age 40-75:  N/A    Osteoporosis Screen age 65:  N/A      Assessment & Plan  1. Annual/Well-woman exam.  A comprehensive well-woman examination is necessary. Blood work will be repeated today. She is advised to reduce intake of white flour and sugar, increase consumption of fruits and vegetables, ensure adequate hydration, and prioritize restful sleep. A target of 120 to 150 minutes of voluntary exercise per week is set. Her blood pressure should be maintained below 140/90. A test for bacterial vaginosis will be conducted. HPV and HIV tests will be performed. The pneumonia vaccine was offered but declined. If the Pap smear results are normal, the next one is due in 3 years. If either the HPV or Pap smear is abnormal, annual screening is recommended.    2. Anxiety.  She reports increased anxiety, particularly related to her recent divorce and work stress. She is not interested in resuming Prozac due to concerns about medication dependency. Non-pharmacological options such as meditation, magnesium, and ashwagandha supplements were discussed. Acupuncture was also suggested as a potential treatment.    3. Thyroid management.  She has been taking herbal  pills instead of prescribed thyroid medication. Her thyroid function will be closely monitored. A follow-up visit in 6 months is scheduled to reassess her thyroid condition.    4. Bartholin Cyst.  A bartholin cyst on left labia was noted on examination. It is not currently causing significant pain or discomfort. She will be referred to gynecology to discuss removal.     Follow-up  A follow-up visit is scheduled in 6 months to monitor her thyroid condition.    Diagnoses and all orders for this visit:    1. Acquired hypothyroidism (Primary)  -     TSH Rfx On Abnormal To Free T4    2. Iron deficiency  -     CBC & Differential  -     Ferritin    3. Cyst of left Bartholin's gland  -     Ambulatory Referral to Gynecology    4. Moderate mixed hyperlipidemia not requiring statin therapy  -     Comprehensive Metabolic Panel    5. Recurrent vaginitis  -     NuSwab VG+, Candida 6sp    6. Encounter for routine gynecological examination with Papanicolaou smear of cervix  -     IGP, Aptima HPV, Rfx 16 / 18,45    7. Screening examination for STI  -     RPR Qualitative with Reflex to Quant  -     Hepatitis Panel, Acute  -     HIV-1 / O / 2 Ag / Antibody    8. Annual physical exam  -     Lipid Panel  -     Hemoglobin A1c        Patient voiced understanding and agreement with plan of care and had no further questions or concerns at this time.     I spent 33 minutes on this encounter, including chart review of any relevant previous encounters, labs, and imaging.   Problems addressed:  new presenting problem: requiring additional assessment, consultation, or diagnostic studies, established problem: inadequately controlled,, established problem: stable  Complexity: labs ordered yes, labs reviewed yes    Alisa Dumas MD  Family Medicine  [facility]      Follow Up  Return in about 6 months (around 2/23/2025) for Recheck.    Patient Instructions   Today: Update screening labs. If pap normal- next due in 5 years.  Trapezius exercises: [email or web address]-exercises-for-the-trapezius-muscle/    Meds: None right now    Healthy lifestyle tips  - Reduce intake of processed food (shop perimeter of grocery store), especially white flour and sugar  - Increase intake of fruits/veggies  - Drink 32-64oz of water a day  - Drink no more than 2 alcoholic beverages/day if you are male, no more than 1 alcoholic beverage/day if you are female  - Get 6-8 hours of restful sleep at night- poor sleep quality has been linked to increased risk of cardiovascular disease  - Voluntary physical activity cumulative 120-150 minutes/week  - Keep blood pressure < 140/90  - Use a barrier protection when having sex with new partner, condom or dental dam: https://www.cdc.gov/condom-use/resources/dental-dam.html    Heart healthy diet from AHA: https://www.heart.org/en/healthy-living/healthy-eating/eat-smart/nutrition-basics/aha-diet-and-lifestyle-recommendations      MH plan:  - Start counseling if not currently established  - Meditation: check out Insight Timer (free karthik)  - Sleep hygiene  - Increase physical activity as tolerated- goal 120mins/week  - Acupressure  - Supplements: magnesium, ashwaganda  - May make appt for Dr. Dumas's acupuncture clinic if desired- Tuesdays, cash pay  - If you experience any thoughts of harming yourself or someone else, please go to the ER immediately.     Resources:  https://www.apa.org/topics/anxiety/    https://www.apa.org/topics/depression/    https://sleepeducation.org/healthy-sleep/healthy-sleep-habits/    https://www.[facility].org/cancer-care/patient-education/acupressure-stress-and-anxiety    Sleep/fatigue plan:  - CBT-I is the most effective treatment for trouble sleeping according to research. You can try it for free at your own pace using this free karthik from [facility]: https://tydy.va.gov/karthik/cbt-i-#:~:text=CBT%2Di%20[employer]ach%20is%20based,Center%20for%20Telehealth%20and%20Technology.  - Keep a  consistent sleep schedule. Get up at the same time every day, even on weekends or during vacations.  - Set a bedtime that is early enough for you to get at least 7-8 hours of sleep.  - Don’t go to bed unless you are sleepy.  - If you don’t fall asleep after 20 minutes, get out of bed. Go do a quiet activity without a lot of light exposure. It is especially important to not get on electronics.  - Establish a relaxing bedtime routine.  - Use your bed only for sleep and sex.  - Make your bedroom quiet and relaxing. Keep the room at a comfortable, cool temperature.  - Limit exposure to bright light in the evenings.  - Turn off electronic devices at least 30 minutes before bedtime.  - Don’t eat a large meal before bedtime. If you are hungry at night, eat a light, healthy snack.  - Exercise regularly and maintain a healthy diet.  - Avoid consuming caffeine in the afternoon or evening.  - Avoid consuming alcohol before bedtime.  - Reduce your fluid intake before bedtime.  https://sleepeducation.org/healthy-sleep/healthy-sleep-habits/         Patient or patient representative verbalized consent for the use of Ambient Listening during the visit with  Alisa Dumas MD for chart documentation. 8/23/2024  16:23 EDT    Answers submitted by the patient for this visit:  Other (Submitted on 8/16/2024)  Please describe your symptoms.: Lump near vagina lip believe to be ingrown hair  Have you had these symptoms before?: Yes  How long have you been having these symptoms?: Greater than 2 weeks  Please describe any probable cause for these symptoms. : Ingrown hair  Primary Reason for Visit (Submitted on 8/16/2024)  What is the primary reason for your visit?: Other

## 2024-08-24 LAB
ALBUMIN SERPL-MCNC: 4.4 G/DL (ref 3.9–4.9)
ALP SERPL-CCNC: 60 IU/L (ref 44–121)
ALT SERPL-CCNC: 12 IU/L (ref 0–32)
AST SERPL-CCNC: 17 IU/L (ref 0–40)
BASOPHILS # BLD AUTO: 0 X10E3/UL (ref 0–0.2)
BASOPHILS NFR BLD AUTO: 1 %
BILIRUB SERPL-MCNC: 0.4 MG/DL (ref 0–1.2)
BUN SERPL-MCNC: 8 MG/DL (ref 6–20)
BUN/CREAT SERPL: 9 (ref 9–23)
CALCIUM SERPL-MCNC: 9.9 MG/DL (ref 8.7–10.2)
CHLORIDE SERPL-SCNC: 99 MMOL/L (ref 96–106)
CHOLEST SERPL-MCNC: 210 MG/DL (ref 100–199)
CO2 SERPL-SCNC: 22 MMOL/L (ref 20–29)
CREAT SERPL-MCNC: 0.94 MG/DL (ref 0.57–1)
EGFRCR SERPLBLD CKD-EPI 2021: 83 ML/MIN/1.73
EOSINOPHIL # BLD AUTO: 0.1 X10E3/UL (ref 0–0.4)
EOSINOPHIL NFR BLD AUTO: 2 %
ERYTHROCYTE [DISTWIDTH] IN BLOOD BY AUTOMATED COUNT: 12.2 % (ref 11.7–15.4)
FERRITIN SERPL-MCNC: 40 NG/ML (ref 15–150)
GLOBULIN SER CALC-MCNC: 3.2 G/DL (ref 1.5–4.5)
GLUCOSE SERPL-MCNC: 83 MG/DL (ref 70–99)
HAV IGM SERPL QL IA: NEGATIVE
HBA1C MFR BLD: 5.9 % (ref 4.8–5.6)
HBV CORE IGM SERPL QL IA: NEGATIVE
HBV SURFACE AG SERPL QL IA: NEGATIVE
HCT VFR BLD AUTO: 39.8 % (ref 34–46.6)
HCV AB SERPL QL IA: NORMAL
HCV IGG SERPL QL IA: NON REACTIVE
HDLC SERPL-MCNC: 72 MG/DL
HGB BLD-MCNC: 12.7 G/DL (ref 11.1–15.9)
HIV 1+2 AB+HIV1 P24 AG SERPL QL IA: NON REACTIVE
IMM GRANULOCYTES # BLD AUTO: 0 X10E3/UL (ref 0–0.1)
IMM GRANULOCYTES NFR BLD AUTO: 0 %
LDLC SERPL CALC-MCNC: 129 MG/DL (ref 0–99)
LYMPHOCYTES # BLD AUTO: 2.2 X10E3/UL (ref 0.7–3.1)
LYMPHOCYTES NFR BLD AUTO: 40 %
MCH RBC QN AUTO: 31.1 PG (ref 26.6–33)
MCHC RBC AUTO-ENTMCNC: 31.9 G/DL (ref 31.5–35.7)
MCV RBC AUTO: 97 FL (ref 79–97)
MONOCYTES # BLD AUTO: 0.5 X10E3/UL (ref 0.1–0.9)
MONOCYTES NFR BLD AUTO: 10 %
NEUTROPHILS # BLD AUTO: 2.6 X10E3/UL (ref 1.4–7)
NEUTROPHILS NFR BLD AUTO: 47 %
PLATELET # BLD AUTO: 210 X10E3/UL (ref 150–450)
POTASSIUM SERPL-SCNC: 4.1 MMOL/L (ref 3.5–5.2)
PROT SERPL-MCNC: 7.6 G/DL (ref 6–8.5)
RBC # BLD AUTO: 4.09 X10E6/UL (ref 3.77–5.28)
RPR SER QL: NON REACTIVE
SODIUM SERPL-SCNC: 137 MMOL/L (ref 134–144)
T4 FREE SERPL-MCNC: 0.99 NG/DL (ref 0.82–1.77)
TRIGL SERPL-MCNC: 52 MG/DL (ref 0–149)
TSH SERPL DL<=0.005 MIU/L-ACNC: 6.93 UIU/ML (ref 0.45–4.5)
VLDLC SERPL CALC-MCNC: 9 MG/DL (ref 5–40)
WBC # BLD AUTO: 5.4 X10E3/UL (ref 3.4–10.8)

## 2024-08-26 ENCOUNTER — TELEPHONE (OUTPATIENT)
Dept: FAMILY MEDICINE CLINIC | Facility: CLINIC | Age: 34
End: 2024-08-26
Payer: COMMERCIAL

## 2024-08-26 LAB
A VAGINAE DNA VAG QL NAA+PROBE: NORMAL SCORE
BVAB2 DNA VAG QL NAA+PROBE: NORMAL SCORE
C ALBICANS DNA VAG QL NAA+PROBE: NEGATIVE
C GLABRATA DNA VAG QL NAA+PROBE: NEGATIVE
C KRUSEI DNA VAG QL NAA+PROBE: NEGATIVE
C LUSITANIAE DNA VAG QL NAA+PROBE: NEGATIVE
C TRACH DNA SPEC QL NAA+PROBE: NEGATIVE
CANDIDA DNA VAG QL NAA+PROBE: NEGATIVE
MEGA1 DNA VAG QL NAA+PROBE: NORMAL SCORE
N GONORRHOEA DNA VAG QL NAA+PROBE: NEGATIVE
T VAGINALIS DNA VAG QL NAA+PROBE: NEGATIVE

## 2024-08-28 LAB
CYTOLOGIST CVX/VAG CYTO: NORMAL
CYTOLOGY CVX/VAG DOC CYTO: NORMAL
CYTOLOGY CVX/VAG DOC THIN PREP: NORMAL
DX ICD CODE: NORMAL
HPV GENOTYPE REFLEX: NORMAL
HPV I/H RISK 4 DNA CVX QL PROBE+SIG AMP: NEGATIVE
Lab: NORMAL
OTHER STN SPEC: NORMAL
STAT OF ADQ CVX/VAG CYTO-IMP: NORMAL

## 2024-10-31 ENCOUNTER — OFFICE VISIT (OUTPATIENT)
Dept: OBSTETRICS AND GYNECOLOGY | Facility: CLINIC | Age: 34
End: 2024-10-31
Payer: COMMERCIAL

## 2024-10-31 VITALS
HEIGHT: 70 IN | WEIGHT: 175.4 LBS | DIASTOLIC BLOOD PRESSURE: 73 MMHG | BODY MASS INDEX: 25.11 KG/M2 | HEART RATE: 81 BPM | SYSTOLIC BLOOD PRESSURE: 115 MMHG

## 2024-10-31 DIAGNOSIS — N90.7 CYST OF VULVA: Primary | ICD-10-CM

## 2024-10-31 RX ORDER — SCOLOPAMINE TRANSDERMAL SYSTEM 1 MG/1
1 PATCH, EXTENDED RELEASE TRANSDERMAL CONTINUOUS
OUTPATIENT
Start: 2024-11-18 | End: 2024-11-21

## 2024-10-31 RX ORDER — ACETAMINOPHEN 500 MG
1000 TABLET ORAL ONCE
OUTPATIENT
Start: 2024-11-18 | End: 2024-10-31

## 2024-10-31 RX ORDER — SODIUM CHLORIDE 0.9 % (FLUSH) 0.9 %
10 SYRINGE (ML) INJECTION AS NEEDED
OUTPATIENT
Start: 2024-11-18

## 2024-10-31 RX ORDER — SODIUM CHLORIDE 0.9 % (FLUSH) 0.9 %
10 SYRINGE (ML) INJECTION EVERY 12 HOURS SCHEDULED
OUTPATIENT
Start: 2024-11-18

## 2024-10-31 NOTE — PROGRESS NOTES
Chief Complaint  Gynecologic Exam (Pt has a bartholin cyst on the outer left labia. Pt states that she has had it for years but it has enlarged over time. No pain or discomfort.)    Subjective        Pb Castellanos presents to [facility]  History of Present Illness  Patient here for evaluation of what she thinks is a Bartholin cyst.  She says it has been present for about the last 6 years.  She reports it seems to have been slowly growing over time.  She says it is not painful.  It does not bother her other than to make her self-conscious and also to make her concerned about what it could be.  Denies any drainage from the area.  She has never had any issues like this before.  She has never previously had any surgery on this area.    She had a Pap smear 2 months ago at her primary care office that was normal.    Menstrual History:  OB History          2    Para   1    Term   1            AB   1    Living   1         SAB   1    IAB        Ectopic        Molar        Multiple        Live Births   1               Patient's last menstrual period was 10/27/2024 (exact date).     Past Medical History:   Diagnosis Date    Anemia     Hypothyroidism      Past Surgical History:   Procedure Laterality Date    CERVICAL CERCLAGE      D & C HYSTEROSCOPY      HYSTEROSCOPY W/ POLYPECTOMY       Family History   Problem Relation Age of Onset    No Known Problems Father     Hypertension Mother     Drug abuse Mother 48             Thyroid disease Maternal Aunt     Heart attack Maternal Great-Grandfather     Breast cancer Neg Hx     Ovarian cancer Neg Hx     Colon cancer Neg Hx     Pancreatic cancer Neg Hx      Current Outpatient Medications on File Prior to Visit   Medication Sig    levothyroxine (SYNTHROID, LEVOTHROID) 88 MCG tablet Take 1 tablet by mouth Every Morning.     No current facility-administered medications on file prior to visit.     No Known Allergies       "  Review of systems:  Constitutional: No fevers, chills, sweats   Eye: No recent visual problems, denies blurry vision   HEENT: No ear pain, nasal congestion, sore throat, voice changes   Respiratory: No shortness of breath, cough, pain on breathing   Cardiovascular: No Chest pain, palpitations   Gastrointestinal: No nausea, vomiting, diarrhea, constipation   Genitourinary: No hematuria, dysuria, lesions on genitalia   Hema/Lymph: Negative for bruising, no edema   Endocrine: Negative for excessive thirst, excessive hunger, heat or cold intolerance   Musculoskeletal: No joint pain, muscle pain, decreased range of motion   Integumentary: No rash, pruritus, abrasions, lesions   Neurologic: No weakness, numbness, headaches   Psychiatric: No anxiety, depression, mood changes       Objective   Vital Signs:  /73   Pulse 81   Ht 177.8 cm (70\")   Wt 79.6 kg (175 lb 6.4 oz)   BMI 25.17 kg/m²   Estimated body mass index is 25.17 kg/m² as calculated from the following:    Height as of this encounter: 177.8 cm (70\").    Weight as of this encounter: 79.6 kg (175 lb 6.4 oz).          Physical Exam   Gen: No acute distress, awake and oriented times three  Abdomen: soft, nontender, no masses or hernia, non distended, normoactive bowel sounds  Pelvic: Exam performed in the presence of a female chaperone  Patient has provided verbal consent to proceed with exam.  Normal external female genitalia,   On the left labia majora at about the 4 o'clock position is a roughly 1 x 2 cm oval-shaped, smooth-walled, fluctuant, mobile lesion that is likely consistent with a vulvar inclusion cyst.  Of note, both Bartholin's glands appear normal.  This does not occupy the typical area of the Bartholin's gland.  There is no erythema or tenderness.  No active drainage.  No signs of infection  Urethra: Normal meatus, no caruncle  Bladder: nontender  Vagina: No blood or discharge  Bimanual exam: Deferred  External anal exam: Normal appearance, " no lesions or hemorrhoids  Rectal: Deferred  Psych: Good judgement and insight, normal affect and mood    Result Review :                Assessment and Plan   Diagnoses and all orders for this visit:    1. Cyst of vulva (Primary)  -     Case Request; Standing  -     Instruct Patient on Coughing, Deep Breathing and Incentive Spirometry; Future  -     sodium chloride 0.9 % flush 10 mL  -     sodium chloride 0.9 % flush 10 mL  -     acetaminophen (TYLENOL) tablet 1,000 mg  -     scopolamine patch 1 mg/72 hr  -     Case Request    Other orders  -     Code Status and Medical Interventions: CPR (Attempt to Resuscitate); Full Support; Standing  -     Follow Anesthesia Guidelines / Protocol; Future  -     Follow Anesthesia Guidelines / Protocol; Standing  -     Chlorhexidine Skin Prep; Future  -     Provide Patient With ERAS Hydration Instructions  -     Provide Patient With ERAS Booklet(s)/Handout  -     Verify NPO Status; Standing  -     Verify / Perform Chlorhexidine Skin Prep; Standing  -     Pregnancy, Urine - Urine, Clean Catch; Standing  -     Insert Peripheral IV; Standing  -     Saline Lock & Maintain IV Access; Standing  -     Place Sequential Compression Device; Standing  -     Maintain Sequential Compression Device; Standing    Patient with a left-sided vulvar cyst.  Does not appear to be consistent with a Bartholin's gland cyst.  Think this is more likely a vulvar inclusion cyst.  Reassurance offered to the patient.  We have discussed management options including just observation versus excision in the office or in the operating room.  I think observation is completely reasonable as this does not appear to be anything concerning.  Patient feels uncomfortable with the cyst and would like to have it removed.  She prefers to do this in the operating room.  We have discussed risks of surgery including bleeding, infection, hematoma formation, wound breakdown, scarring, persistent pain after surgery, dyspareunia  after surgery, need for further surgery, inadvertent injury to GI/ structures, anesthesia complications, etc.  All questions were answered and she wishes to proceed.    We will plan for a left-sided vulvar cyst excision in the next few weeks.  Typical recovery time discussed with patient.         Follow Up   Return After surgery.  Patient was given instructions and counseling regarding her condition or for health maintenance advice. Please see specific information pulled into the AVS if appropriate.

## 2024-11-04 ENCOUNTER — PATIENT ROUNDING (BHMG ONLY) (OUTPATIENT)
Dept: OBSTETRICS AND GYNECOLOGY | Facility: CLINIC | Age: 34
End: 2024-11-04
Payer: COMMERCIAL

## 2024-11-04 ENCOUNTER — PATIENT MESSAGE (OUTPATIENT)
Dept: OBSTETRICS AND GYNECOLOGY | Facility: CLINIC | Age: 34
End: 2024-11-04
Payer: COMMERCIAL

## 2024-11-04 NOTE — PROGRESS NOTES
My chart message has been sent to the patient for PATIENT ROUNDING with [facility].

## 2024-11-05 PROBLEM — N90.7 CYST OF VULVA: Status: ACTIVE | Noted: 2024-10-31

## 2024-11-06 ENCOUNTER — TELEPHONE (OUTPATIENT)
Dept: OBSTETRICS AND GYNECOLOGY | Facility: CLINIC | Age: 34
End: 2024-11-06
Payer: COMMERCIAL

## 2024-11-19 ENCOUNTER — PATIENT MESSAGE (OUTPATIENT)
Dept: FAMILY MEDICINE CLINIC | Facility: CLINIC | Age: 34
End: 2024-11-19
Payer: COMMERCIAL

## 2024-11-19 ENCOUNTER — TELEPHONE (OUTPATIENT)
Dept: OBSTETRICS AND GYNECOLOGY | Facility: CLINIC | Age: 34
End: 2024-11-19
Payer: COMMERCIAL

## 2024-11-20 NOTE — TELEPHONE ENCOUNTER
Unfortunately, Dr Shah does not have any availability to see you this week as his schedule is already quite over full. If you think you have an infection there, he would recommend that you go to an urgent care to be evaluated to see if you need to start antibiotics. I would be happy to schedule an appointment with Dr Shah to discuss long-term management if the cyst is coming back.   My Chart message sent.

## 2024-11-22 NOTE — TELEPHONE ENCOUNTER
Pt is requesting to reschedule her surgery(11/18/24) to remove the cyst that had popped. Does this need to just go to surgery scheduler? Please advise. Thank you

## 2024-11-26 ENCOUNTER — PREP FOR SURGERY (OUTPATIENT)
Age: 34
End: 2024-11-26
Payer: COMMERCIAL

## 2024-11-26 ENCOUNTER — TELEPHONE (OUTPATIENT)
Dept: OBSTETRICS AND GYNECOLOGY | Facility: CLINIC | Age: 34
End: 2024-11-26
Payer: COMMERCIAL

## 2024-11-26 DIAGNOSIS — N90.7 CYST OF VULVA: Primary | ICD-10-CM

## 2024-11-27 ENCOUNTER — TELEPHONE (OUTPATIENT)
Dept: OBSTETRICS AND GYNECOLOGY | Facility: CLINIC | Age: 34
End: 2024-11-27
Payer: COMMERCIAL

## 2024-11-27 NOTE — TELEPHONE ENCOUNTER
Tried to call pt to go over surgery details for 12-5, no answer and no vm box. Will continue trying.     Makenna Duran

## 2024-12-02 ENCOUNTER — TELEPHONE (OUTPATIENT)
Dept: OBSTETRICS AND GYNECOLOGY | Facility: CLINIC | Age: 34
End: 2024-12-02
Payer: COMMERCIAL

## 2024-12-02 NOTE — TELEPHONE ENCOUNTER
Spoke with pt and she is not able to do her surgery this Thursday as she stated she needs more time to make arrangements. Pt stated she needs at least a 2 week notice. (Tried calling pt on 11-27, no answer and no vm box.)     Makenna Duran

## 2024-12-08 DIAGNOSIS — E03.8 OTHER SPECIFIED HYPOTHYROIDISM: ICD-10-CM

## 2024-12-09 RX ORDER — LEVOTHYROXINE SODIUM 88 UG/1
88 TABLET ORAL
Qty: 90 TABLET | Refills: 3 | Status: SHIPPED | OUTPATIENT
Start: 2024-12-09

## 2024-12-09 NOTE — TELEPHONE ENCOUNTER
Rx Refill Note  Requested Prescriptions     Pending Prescriptions Disp Refills    levothyroxine (SYNTHROID, LEVOTHROID) 88 MCG tablet 90 tablet 3     Sig: Take 1 tablet by mouth Every Morning.      Last office visit with prescribing clinician: 8/23/2024   Next office visit with prescribing clinician: Visit date not found     Randy Fontanez CMA  12/09/24, 08:52 EST

## 2024-12-10 ENCOUNTER — TELEPHONE (OUTPATIENT)
Dept: OBSTETRICS AND GYNECOLOGY | Facility: CLINIC | Age: 34
End: 2024-12-10
Payer: COMMERCIAL

## 2024-12-13 NOTE — TELEPHONE ENCOUNTER
Surgery has been cancelled and placed in the depot. Can someone call this patient and schedule her for a follow up to re-evaluate her cyst? Thanks!

## 2025-02-20 ENCOUNTER — OFFICE VISIT (OUTPATIENT)
Dept: FAMILY MEDICINE CLINIC | Facility: CLINIC | Age: 35
End: 2025-02-20
Payer: COMMERCIAL

## 2025-02-20 VITALS
SYSTOLIC BLOOD PRESSURE: 104 MMHG | DIASTOLIC BLOOD PRESSURE: 60 MMHG | WEIGHT: 177 LBS | OXYGEN SATURATION: 97 % | HEIGHT: 70 IN | BODY MASS INDEX: 25.34 KG/M2 | HEART RATE: 77 BPM

## 2025-02-20 DIAGNOSIS — E03.9 ACQUIRED HYPOTHYROIDISM: ICD-10-CM

## 2025-02-20 DIAGNOSIS — E78.2 MIXED HYPERLIPIDEMIA: ICD-10-CM

## 2025-02-20 DIAGNOSIS — F41.9 ANXIETY: Primary | ICD-10-CM

## 2025-02-20 DIAGNOSIS — R73.03 PREDIABETES: ICD-10-CM

## 2025-02-20 DIAGNOSIS — R42 DIZZINESS: ICD-10-CM

## 2025-02-20 PROCEDURE — 99214 OFFICE O/P EST MOD 30 MIN: CPT | Performed by: FAMILY MEDICINE

## 2025-02-20 RX ORDER — LEVOTHYROXINE SODIUM 88 UG/1
88 TABLET ORAL
Qty: 90 TABLET | Refills: 3 | Status: SHIPPED | OUTPATIENT
Start: 2025-02-20

## 2025-02-20 NOTE — PATIENT INSTRUCTIONS
Today: Repeat labs    Meds: Restart Prozac 20mg every morning- if dose is working for you let me know and will refill    Dizziness plan  - Ensure adequate hydration 64 oz/day with one electrolyte drinks  - Increase salt intake- eat a cup of salted nuts or chips or pretzels  - Wear compression socks    MH plan:  - Start counseling if not currently established  - Meditation: check out Insight Timer (free karthik)  - Sleep hygiene  - Increase physical activity as tolerated- goal 120mins/week  - Acupressure or EFT (links below)  - Supplements: magnesium, ashwaganda  - May make appt for Dr. Dumas's acupuncture clinic if desired- Tuesdays, cash pay. [facility] in the [place] takes insurance.  - If you experience any thoughts of harming yourself or someone else, please go to the ER immediately.     Resources:  https://www.apa.org/topics/anxiety/    https://www.apa.org/topics/depression/    https://sleepeducation.org/healthy-sleep/healthy-sleep-habits/    https://Intelliworks.[place].ca/Health/pages/conditions.aspx?korc=ddk8337&lang=en-ca    https://www.[facility].org/cancer-care/patient-education/acupressure-stress-and-anxiety

## 2025-02-20 NOTE — PROGRESS NOTES
Chief Complaint  Chief Complaint   Patient presents with    Anxiety    Hypothyroidism    Fatigue    Dizziness       Subjective    History of Present Illness  Pb Castellanos is a 34 y.o. female presents to [facility] for followup    History of Present Illness  The patient presents for a follow-up visit.    She reports persistent fatigue, even after discontinuing her second job. She is uncertain about the efficacy of her thyroid medication, as she continues to experience tiredness. She has not made any adjustments to her medication regimen. She takes her medication every morning on an empty stomach. She notes that her nails have strengthened, but she continues to experience hair loss, although less severe than before. She does not get the best sleep, but she goes to sleep early enough to get 7 or 8 hours of sleep. She sometimes wakes up at night. She does not work in bed.     She has been feeling dizzy. She has been experiencing spells of dizziness throughout the day, which feels like pressure on her brain. She feels like she will fall out. It happens anytime and lasts for a second or so. She can be standing up, walking, or just sitting there when she experiences it. She remembers waking up one afternoon and could not see because her eyes were so blurry. It lasted for an hour or two before she realized that it was no longer there. She thinks it is time for her to get some stronger glasses because she is looking at the TV and it looks blurry. She noted her glucose level was low. She drinks at least 64 ounces of water per day and sometimes drinks electrolyte drinks, but not on a daily basis. She does not like salt. She has been craving sweets. She is wondering if it has anything to do with prediabetes. She has been feeling dizzy. She has been experiencing spells of dizziness throughout the day, which feels like pressure on her brain. She feels like she will fall out. It happens anytime  "and lasts for a second or so. She can be standing up, walking, or just sitting there when she experiences it. She remembers waking up one afternoon and could not see because her eyes were so blurry. It lasted for an hour or two before she realized that it was no longer there. She thinks it is time for her to get some stronger glasses because she is looking at the TV and it looks blurry. She noted her glucose level was low. She drinks at least 64 ounces of water per day and sometimes drinks electrolyte drinks, but not on a daily basis. She does not like salt. She has been craving sweets. She is wondering if it has anything to do with prediabetes.    She has been  for a year now. She has been experiencing anxiety but does not believe she is depressed. She has not been engaging in counseling sessions. She has been prescribed Prozac and felt it was effective but discontinued it due to desire to avoid dependency. She is considering resuming Prozac treatment.     She was scheduled for surgery to remove a vaginal cyst, but it ruptured spontaneously during a shower, releasing a thick, malodorous white substance. The cyst has not recurred since the rupture.     She also reports the development of vulvar boils coinciding with her menstrual cycle, which are tender and subcutaneous. These boils have been a recurrent issue for several years, with no known family history. They typically resolve post-menstruation without scarring but may cause itching.    FAMILY HISTORY  Her paternal aunt had diabetes. Her maternal grandmother had high cholesterol.    MEDICATIONS  Current: Prozac    Objective   Vitals:    02/20/25 1327   BP: 104/60   Pulse: 77   SpO2: 97%   Weight: 80.3 kg (177 lb)   Height: 177.8 cm (70\")      Physical Exam  Vitals reviewed.   Constitutional:       Appearance: Normal appearance.   HENT:      Head: Normocephalic and atraumatic.   Cardiovascular:      Comments: Well perfused  Pulmonary:      Effort: " Pulmonary effort is normal. No respiratory distress.   Abdominal:      General: There is no distension.   Musculoskeletal:         General: Normal range of motion.   Neurological:      Mental Status: She is alert. Mental status is at baseline.        CURTIS-7 (General Anxiety Disorder-7) from [employer]  on 2/20/2025  ** All calculations should be rechecked by clinician prior to use **    RESULT SUMMARY:  19 points  Severe anxiety disorder.    Functionally, the patient is “somewhat” having difficulty with life tasks due to their symptoms.      INPUTS:  Feeling nervous, anxious, or on edge --> 3 = Nearly every day  Not being able to stop or control worrying --> 3 = Nearly every day  Worrying too much about different things --> 3 = Nearly every day  Trouble relaxing --> 3 = Nearly every day  Being so restless that it's hard to sit still --> 3 = Nearly every day  Becoming easily annoyed or irritable --> 2 = More than half the days  Feeling afraid as if something awful might happen --> 2 = More than half the days  Ask the patient: how difficult have these problems made it to do work, take care of things at home, or get along with other people? --> 1 = Somewhat difficult    PHQ-9 Depression Screening  Little interest or pleasure in doing things? Almost all   Feeling down, depressed, or hopeless? Several days   PHQ-2 Total Score 4   Trouble falling or staying asleep, or sleeping too much? Almost all   Feeling tired or having little energy? Almost all   Poor appetite or overeating? Over half   Feeling bad about yourself - or that you are a failure or have let yourself or your family down? Not at all   Trouble concentrating on things, such as reading the newspaper or watching television? Several days   Moving or speaking so slowly that other people could have noticed? Or the opposite - being so fidgety or restless that you have been moving around a lot more than usual? Not at all   Thoughts that you would be better off dead,  or of hurting yourself in some way? Not at all   PHQ-9 Total Score 13   If you checked off any problems, how difficult have these problems made it for you to do your work, take care of things at home, or get along with other people? Somewhat difficult         The following data was reviewed by: Alisa Dumas MD on 02/20/2025:  CMP          8/23/2024    15:06   CMP   Glucose 83    BUN 8    Creatinine 0.94    EGFR 83    Sodium 137    Potassium 4.1    Chloride 99    Calcium 9.9    Total Protein 7.6    Albumin 4.4    Globulin 3.2    Total Bilirubin 0.4    Alkaline Phosphatase 60    AST (SGOT) 17    ALT (SGPT) 12    BUN/Creatinine Ratio 9      CBC          8/23/2024    15:06   CBC   WBC 5.4    RBC 4.09    Hemoglobin 12.7    Hematocrit 39.8    MCV 97    MCH 31.1    MCHC 31.9    RDW 12.2    Platelets 210      Lipid Panel          8/23/2024    15:06   Lipid Panel   Total Cholesterol 210    Triglycerides 52    HDL Cholesterol 72    VLDL Cholesterol 9    LDL Cholesterol  129      TSH          8/23/2024    15:06   TSH   TSH 6.930      Most Recent A1C          8/23/2024    15:06   HGBA1C Most Recent   Hemoglobin A1C 5.9      Consultant notes gynecology      Assessment and Plan  Pb Castellanso is a 34 y.o. female presents to [facility] today for followup    Assessment & Plan  1. Anxiety.  Her anxiety score is 19, indicating significant anxiety. She reports difficulty sleeping and constant worrying. Counseling, meditation, and guided prayers are recommended to help manage anxiety. Acupuncture is suggested as a potential treatment option. Prozac 20 mg will be restarted every morning. If she finds it helpful, she is advised to send a message via [employer] for a refill.    2. Dizziness.  She reports experiencing dizziness throughout the day, sometimes accompanied by blurry vision. Increasing salt intake slightly and consuming electrolyte drinks like Liquid I.V., Propel, or Gatorade are recommended.  Wearing compression socks is also suggested to help manage symptoms. Labs will be repeated today to check B12, vitamin D, and iron levels.    3. Hypothyroid  She reports persistent fatigue and occasional vertigo. Previous thyroid levels were high. Labs will be repeated today to check thyroid function. A refill for her thyroid medication will be provided.      Follow-up  The patient will follow up in 6 months for her annual visit.    Diagnoses and all orders for this visit:    1. Anxiety (Primary)  -     FLUoxetine (PROzac) 20 MG capsule; Take 1 capsule by mouth Daily. Indications: Generalized Anxiety Disorder  Dispense: 30 capsule; Refill: 0    2. Dizziness  -     CBC & Differential  -     Comprehensive Metabolic Panel  -     Vitamin B12  -     Vitamin D,25-Hydroxy  -     Ferritin    3. Acquired hypothyroidism  -     TSH Rfx On Abnormal To Free T4  -     levothyroxine (SYNTHROID, LEVOTHROID) 88 MCG tablet; Take 1 tablet by mouth Every Morning.  Dispense: 90 tablet; Refill: 3    4. Prediabetes  -     Hemoglobin A1c    5. Mixed hyperlipidemia  -     Lipid Panel        Patient voiced understanding and agreement with plan of care and had no further questions or concerns at this time.   Medical student present during appointment with patient consent; any time patient spent talking to the medical student or being examined by medical student not included in billable time.    Problems addressed:  new presenting problem: requiring additional assessment, consultation, or diagnostic studies, established problem:  worsening or not responding to treatment, established problem: inadequately controlled,  Complexity: labs ordered yes, labs reviewed yes  Risk: prescription drug management    Alisa Dumas MD  Family Medicine  [facility]      Follow Up  Return in about 6 months (around 8/20/2025) for Annual physical; schedule labs 1 week before appt please.    Patient Instructions   Today: Repeat labs    Meds:  Restart Prozac 20mg every morning- if dose is working for you let me know and will refill    Dizziness plan  - Ensure adequate hydration 64 oz/day with one electrolyte drinks  - Increase salt intake- eat a cup of salted nuts or chips or pretzels  - Wear compression socks    MH plan:  - Start counseling if not currently established  - Meditation: check out Insight Timer (free karthik)  - Sleep hygiene  - Increase physical activity as tolerated- goal 120mins/week  - Acupressure or EFT (links below)  - Supplements: magnesium, ashwaganda  - May make appt for Dr. Dumas's acupuncture clinic if desired- Tuesdays, cash pay. [facility] in the [place] takes insurance.  - If you experience any thoughts of harming yourself or someone else, please go to the ER immediately.     Resources:  https://www.apa.org/topics/anxiety/    https://www.apa.org/topics/depression/    https://sleepeducation.org/healthy-sleep/healthy-[email or web address]/    https://Xspand.[place].ca/Health/pages/conditions.aspx?efxi=obd3890&lang=en-ca    https://www.[facility].org/cancer-care/patient-education/acupressure-stress-and-anxiety               Patient or patient representative verbalized consent for the use of Ambient Listening during the visit with  Alisa Dumas MD for chart documentation. 2/20/2025  16:16 EST

## 2025-02-21 LAB
25(OH)D3+25(OH)D2 SERPL-MCNC: 30.1 NG/ML (ref 30–100)
ALBUMIN SERPL-MCNC: 4.4 G/DL (ref 3.5–5.2)
ALBUMIN/GLOB SERPL: 1.2 G/DL
ALP SERPL-CCNC: 61 U/L (ref 39–117)
ALT SERPL-CCNC: 8 U/L (ref 1–33)
AST SERPL-CCNC: 13 U/L (ref 1–32)
BASOPHILS # BLD AUTO: 0.02 10*3/MM3 (ref 0–0.2)
BASOPHILS NFR BLD AUTO: 0.3 % (ref 0–1.5)
BILIRUB SERPL-MCNC: 0.4 MG/DL (ref 0–1.2)
BUN SERPL-MCNC: 11 MG/DL (ref 6–20)
BUN/CREAT SERPL: 11.8 (ref 7–25)
CALCIUM SERPL-MCNC: 9.7 MG/DL (ref 8.6–10.5)
CHLORIDE SERPL-SCNC: 101 MMOL/L (ref 98–107)
CHOLEST SERPL-MCNC: 220 MG/DL (ref 0–200)
CO2 SERPL-SCNC: 26.6 MMOL/L (ref 22–29)
CREAT SERPL-MCNC: 0.93 MG/DL (ref 0.57–1)
EGFRCR SERPLBLD CKD-EPI 2021: 83.4 ML/MIN/1.73
EOSINOPHIL # BLD AUTO: 0.1 10*3/MM3 (ref 0–0.4)
EOSINOPHIL NFR BLD AUTO: 1.4 % (ref 0.3–6.2)
ERYTHROCYTE [DISTWIDTH] IN BLOOD BY AUTOMATED COUNT: 12.7 % (ref 12.3–15.4)
FERRITIN SERPL-MCNC: 20.5 NG/ML (ref 13–150)
GLOBULIN SER CALC-MCNC: 3.6 GM/DL
GLUCOSE SERPL-MCNC: 79 MG/DL (ref 65–99)
HBA1C MFR BLD: 6 % (ref 4.8–5.6)
HCT VFR BLD AUTO: 37.6 % (ref 34–46.6)
HDLC SERPL-MCNC: 78 MG/DL (ref 40–60)
HGB BLD-MCNC: 12.4 G/DL (ref 12–15.9)
IMM GRANULOCYTES # BLD AUTO: 0.01 10*3/MM3 (ref 0–0.05)
IMM GRANULOCYTES NFR BLD AUTO: 0.1 % (ref 0–0.5)
LDLC SERPL CALC-MCNC: 127 MG/DL (ref 0–100)
LYMPHOCYTES # BLD AUTO: 2.53 10*3/MM3 (ref 0.7–3.1)
LYMPHOCYTES NFR BLD AUTO: 34.5 % (ref 19.6–45.3)
MCH RBC QN AUTO: 31.4 PG (ref 26.6–33)
MCHC RBC AUTO-ENTMCNC: 33 G/DL (ref 31.5–35.7)
MCV RBC AUTO: 95.2 FL (ref 79–97)
MONOCYTES # BLD AUTO: 0.73 10*3/MM3 (ref 0.1–0.9)
MONOCYTES NFR BLD AUTO: 10 % (ref 5–12)
NEUTROPHILS # BLD AUTO: 3.94 10*3/MM3 (ref 1.7–7)
NEUTROPHILS NFR BLD AUTO: 53.7 % (ref 42.7–76)
NRBC BLD AUTO-RTO: 0 /100 WBC (ref 0–0.2)
PLATELET # BLD AUTO: 211 10*3/MM3 (ref 140–450)
POTASSIUM SERPL-SCNC: 4.1 MMOL/L (ref 3.5–5.2)
PROT SERPL-MCNC: 8 G/DL (ref 6–8.5)
RBC # BLD AUTO: 3.95 10*6/MM3 (ref 3.77–5.28)
SODIUM SERPL-SCNC: 137 MMOL/L (ref 136–145)
TRIGL SERPL-MCNC: 87 MG/DL (ref 0–150)
TSH SERPL DL<=0.005 MIU/L-ACNC: 0.93 UIU/ML (ref 0.27–4.2)
VIT B12 SERPL-MCNC: 646 PG/ML (ref 211–946)
VLDLC SERPL CALC-MCNC: 15 MG/DL (ref 5–40)
WBC # BLD AUTO: 7.33 10*3/MM3 (ref 3.4–10.8)

## 2025-02-22 NOTE — PROGRESS NOTES
Hello!    Here are the results of your most recent labs:    Your vitamin D, vitamin B12, CBC, Comprehensive Metabolic Panel, and Thyroid Function was all normal.     Your ferritin is 20, ferritin is an indirect marker of iron stores, goal is 50-75.  Not having enough iron stores may be contributing to your fatigue. I recommend oral iron supplementation for 12-16 weeks. You can get OTC iron 325mg (the bottle may say 65mg elemental iron), any brand is ok, and take one every day. I recommend taking iron with a vitamin C containing beverage such as orange juice to enhance absorption. If possible, avoid dairy, Tums, and coffee/tea around when you take iron as they may decrease absorption. Common side effects of iron supplementation include some stomach upset and constipation. Be sure to drink enough water and eat enough fiber when taking iron, you may use miralax if needed for constipation. Your poop may be dark green while taking iron, that is normal. You should notice an improvement within 6-8 weeks of daily iron supplementation as it takes a bit for the body to rebuild the stores it needs. If you do not notice an improvement, please let me know and we can repeat labs.      Your A1c was in the prediabetic range at 6; if it goes over 6.4 you will have type 2 diabetes. I recommend decreasing carbohydrate intake to 150-200 g/day, reducing processed food, increasing fruit and vegetable intake, at least 120 minutes of physical activity per week as tolerated, and controlling blood pressure with a goal of less than 120/80. We can start a medication called Metformin to help control your blood sugar, let me know if you would like that ordered.     Your cholesterol was elevated.  For diet and lifestyle intervention, I recommend decreasing intake of trans and saturated fats, and red meat.  Increase physical activity as tolerated.  You may try supplementing with omega-3 1-2g daily, berberine 500mg daily, and/or whole flaxseed if  desired.    Please continue your current medications.  Please contact me with any questions.    Thank you!  Dr. Dumas

## 2025-05-22 ENCOUNTER — PATIENT MESSAGE (OUTPATIENT)
Dept: FAMILY MEDICINE CLINIC | Facility: CLINIC | Age: 35
End: 2025-05-22
Payer: COMMERCIAL

## 2025-06-03 NOTE — TELEPHONE ENCOUNTER
I spoke with Taylor Castellanos and she told me this was a miscommunication and no one received information.   It was nice speaking with the patient.

## 2025-08-25 ENCOUNTER — OFFICE VISIT (OUTPATIENT)
Dept: FAMILY MEDICINE CLINIC | Facility: CLINIC | Age: 35
End: 2025-08-25
Payer: COMMERCIAL

## 2025-08-25 VITALS
TEMPERATURE: 97.8 F | OXYGEN SATURATION: 97 % | DIASTOLIC BLOOD PRESSURE: 77 MMHG | SYSTOLIC BLOOD PRESSURE: 110 MMHG | BODY MASS INDEX: 26.13 KG/M2 | HEART RATE: 65 BPM | HEIGHT: 70 IN | WEIGHT: 182.5 LBS

## 2025-08-25 DIAGNOSIS — R73.03 PREDIABETES: ICD-10-CM

## 2025-08-25 DIAGNOSIS — R06.83 SNORING: ICD-10-CM

## 2025-08-25 DIAGNOSIS — N76.0 RECURRENT VAGINITIS: ICD-10-CM

## 2025-08-25 DIAGNOSIS — E78.2 MIXED HYPERLIPIDEMIA: ICD-10-CM

## 2025-08-25 DIAGNOSIS — E61.1 IRON DEFICIENCY: ICD-10-CM

## 2025-08-25 DIAGNOSIS — E03.9 ACQUIRED HYPOTHYROIDISM: ICD-10-CM

## 2025-08-25 DIAGNOSIS — F41.9 ANXIETY: ICD-10-CM

## 2025-08-25 DIAGNOSIS — F51.01 PRIMARY INSOMNIA: ICD-10-CM

## 2025-08-25 DIAGNOSIS — J33.9 NASAL POLYP: ICD-10-CM

## 2025-08-25 DIAGNOSIS — Z00.00 ANNUAL PHYSICAL EXAM: ICD-10-CM

## 2025-08-25 DIAGNOSIS — F51.01 PRIMARY INSOMNIA: Primary | ICD-10-CM

## 2025-08-25 PROCEDURE — 99214 OFFICE O/P EST MOD 30 MIN: CPT | Performed by: FAMILY MEDICINE

## 2025-08-25 PROCEDURE — 99395 PREV VISIT EST AGE 18-39: CPT | Performed by: FAMILY MEDICINE

## 2025-08-25 RX ORDER — AMITRIPTYLINE HYDROCHLORIDE 50 MG/1
50 TABLET ORAL NIGHTLY PRN
Qty: 30 TABLET | Refills: 1 | Status: SHIPPED | OUTPATIENT
Start: 2025-08-25 | End: 2025-08-26 | Stop reason: SDUPTHER

## 2025-08-25 RX ORDER — LEVOTHYROXINE SODIUM 88 UG/1
88 TABLET ORAL
Qty: 90 TABLET | Refills: 3 | Status: SHIPPED | OUTPATIENT
Start: 2025-08-25 | End: 2025-08-26 | Stop reason: SDUPTHER

## 2025-08-25 RX ORDER — CLINDAMYCIN PHOSPHATE 20 MG/G
1 CREAM VAGINAL NIGHTLY
Qty: 40 G | Refills: 0 | Status: SHIPPED | OUTPATIENT
Start: 2025-08-25 | End: 2025-08-26 | Stop reason: SDUPTHER

## 2025-08-26 ENCOUNTER — PATIENT MESSAGE (OUTPATIENT)
Dept: FAMILY MEDICINE CLINIC | Facility: CLINIC | Age: 35
End: 2025-08-26
Payer: COMMERCIAL

## 2025-08-26 DIAGNOSIS — N76.0 RECURRENT VAGINITIS: ICD-10-CM

## 2025-08-26 DIAGNOSIS — F41.9 ANXIETY: ICD-10-CM

## 2025-08-26 DIAGNOSIS — E55.9 VITAMIN D DEFICIENCY: Primary | ICD-10-CM

## 2025-08-26 DIAGNOSIS — F51.01 PRIMARY INSOMNIA: ICD-10-CM

## 2025-08-26 DIAGNOSIS — J33.9 NASAL POLYP: ICD-10-CM

## 2025-08-26 DIAGNOSIS — E61.1 IRON DEFICIENCY: ICD-10-CM

## 2025-08-26 DIAGNOSIS — E03.9 ACQUIRED HYPOTHYROIDISM: ICD-10-CM

## 2025-08-26 LAB
ALBUMIN SERPL-MCNC: 4.2 G/DL (ref 3.5–5.2)
ALBUMIN/GLOB SERPL: 1.4 G/DL
ALP SERPL-CCNC: 56 U/L (ref 39–117)
ALT SERPL-CCNC: 8 U/L (ref 1–33)
AST SERPL-CCNC: 15 U/L (ref 1–32)
BASOPHILS # BLD AUTO: 0.02 10*3/MM3 (ref 0–0.2)
BASOPHILS NFR BLD AUTO: 0.3 % (ref 0–1.5)
BILIRUB SERPL-MCNC: 0.4 MG/DL (ref 0–1.2)
BUN SERPL-MCNC: 10 MG/DL (ref 6–20)
BUN/CREAT SERPL: 11.2 (ref 7–25)
CALCIUM SERPL-MCNC: 9.1 MG/DL (ref 8.6–10.5)
CHLORIDE SERPL-SCNC: 103 MMOL/L (ref 98–107)
CHOLEST SERPL-MCNC: 187 MG/DL (ref 0–200)
CO2 SERPL-SCNC: 25.3 MMOL/L (ref 22–29)
CREAT SERPL-MCNC: 0.89 MG/DL (ref 0.57–1)
EGFRCR SERPLBLD CKD-EPI 2021: 87.9 ML/MIN/1.73
EOSINOPHIL # BLD AUTO: 0.14 10*3/MM3 (ref 0–0.4)
EOSINOPHIL NFR BLD AUTO: 2.1 % (ref 0.3–6.2)
ERYTHROCYTE [DISTWIDTH] IN BLOOD BY AUTOMATED COUNT: 12.8 % (ref 12.3–15.4)
FERRITIN SERPL-MCNC: 20.2 NG/ML (ref 13–150)
GLOBULIN SER CALC-MCNC: 3 GM/DL
GLUCOSE SERPL-MCNC: 85 MG/DL (ref 65–99)
HBA1C MFR BLD: 5.5 % (ref 4.8–5.6)
HCT VFR BLD AUTO: 34.8 % (ref 34–46.6)
HDLC SERPL-MCNC: 66 MG/DL (ref 40–60)
HGB BLD-MCNC: 11.3 G/DL (ref 12–15.9)
IMM GRANULOCYTES # BLD AUTO: 0.02 10*3/MM3 (ref 0–0.05)
IMM GRANULOCYTES NFR BLD AUTO: 0.3 % (ref 0–0.5)
IRON SERPL-MCNC: 46 MCG/DL (ref 37–145)
LDLC SERPL CALC-MCNC: 109 MG/DL (ref 0–100)
LYMPHOCYTES # BLD AUTO: 2.63 10*3/MM3 (ref 0.7–3.1)
LYMPHOCYTES NFR BLD AUTO: 39.6 % (ref 19.6–45.3)
MCH RBC QN AUTO: 30.7 PG (ref 26.6–33)
MCHC RBC AUTO-ENTMCNC: 32.5 G/DL (ref 31.5–35.7)
MCV RBC AUTO: 94.6 FL (ref 79–97)
MONOCYTES # BLD AUTO: 0.48 10*3/MM3 (ref 0.1–0.9)
MONOCYTES NFR BLD AUTO: 7.2 % (ref 5–12)
NEUTROPHILS # BLD AUTO: 3.35 10*3/MM3 (ref 1.7–7)
NEUTROPHILS NFR BLD AUTO: 50.5 % (ref 42.7–76)
NRBC BLD AUTO-RTO: 0 /100 WBC (ref 0–0.2)
PLATELET # BLD AUTO: 216 10*3/MM3 (ref 140–450)
POTASSIUM SERPL-SCNC: 4.1 MMOL/L (ref 3.5–5.2)
PROT SERPL-MCNC: 7.2 G/DL (ref 6–8.5)
RBC # BLD AUTO: 3.68 10*6/MM3 (ref 3.77–5.28)
SODIUM SERPL-SCNC: 139 MMOL/L (ref 136–145)
TRIGL SERPL-MCNC: 65 MG/DL (ref 0–150)
TSH SERPL DL<=0.005 MIU/L-ACNC: 0.95 UIU/ML (ref 0.27–4.2)
VLDLC SERPL CALC-MCNC: 12 MG/DL (ref 5–40)
WBC # BLD AUTO: 6.64 10*3/MM3 (ref 3.4–10.8)

## 2025-08-26 RX ORDER — FLUTICASONE PROPIONATE 50 MCG
2 SPRAY, SUSPENSION (ML) NASAL DAILY
Qty: 16 G | Refills: 3 | Status: SHIPPED | OUTPATIENT
Start: 2025-08-26

## 2025-08-26 RX ORDER — LEVOTHYROXINE SODIUM 88 UG/1
88 TABLET ORAL
Qty: 90 TABLET | Refills: 3 | OUTPATIENT
Start: 2025-08-26

## 2025-08-26 RX ORDER — CLINDAMYCIN PHOSPHATE 20 MG/G
1 CREAM VAGINAL NIGHTLY
Qty: 40 G | Refills: 0 | OUTPATIENT
Start: 2025-08-26

## 2025-08-26 RX ORDER — FERROUS SULFATE 325(65) MG
325 TABLET ORAL
Qty: 90 TABLET | Refills: 0 | Status: SHIPPED | OUTPATIENT
Start: 2025-08-26

## 2025-08-26 RX ORDER — CHOLECALCIFEROL (VITAMIN D3) 25 MCG
1000 TABLET ORAL DAILY
Qty: 90 TABLET | Refills: 0 | Status: SHIPPED | OUTPATIENT
Start: 2025-08-26

## 2025-08-26 RX ORDER — LEVOTHYROXINE SODIUM 88 UG/1
88 TABLET ORAL
Qty: 90 TABLET | Refills: 3 | Status: SHIPPED | OUTPATIENT
Start: 2025-08-26

## 2025-08-26 RX ORDER — METRONIDAZOLE 500 MG/1
500 TABLET ORAL 2 TIMES DAILY
Qty: 20 TABLET | Refills: 0 | Status: SHIPPED | OUTPATIENT
Start: 2025-08-26 | End: 2025-09-05

## 2025-08-26 RX ORDER — AMITRIPTYLINE HYDROCHLORIDE 50 MG/1
50 TABLET ORAL NIGHTLY PRN
Qty: 30 TABLET | Refills: 1 | Status: SHIPPED | OUTPATIENT
Start: 2025-08-26

## 2025-08-26 RX ORDER — AMITRIPTYLINE HYDROCHLORIDE 50 MG/1
50 TABLET ORAL NIGHTLY PRN
Qty: 30 TABLET | Refills: 1 | OUTPATIENT
Start: 2025-08-26

## 2025-08-26 RX ORDER — CLINDAMYCIN PHOSPHATE 20 MG/G
1 CREAM VAGINAL NIGHTLY
Qty: 40 G | Refills: 0 | Status: SHIPPED | OUTPATIENT
Start: 2025-08-26

## 2025-08-27 LAB
A VAGINAE DNA VAG QL NAA+PROBE: NORMAL SCORE
BVAB2 DNA VAG QL NAA+PROBE: NORMAL SCORE
C ALBICANS DNA VAG QL NAA+PROBE: NEGATIVE
C GLABRATA DNA VAG QL NAA+PROBE: NEGATIVE
C KRUSEI DNA VAG QL NAA+PROBE: NEGATIVE
C LUSITANIAE DNA VAG QL NAA+PROBE: NEGATIVE
C TRACH DNA SPEC QL NAA+PROBE: NEGATIVE
CANDIDA DNA VAG QL NAA+PROBE: NEGATIVE
MEGA1 DNA VAG QL NAA+PROBE: NORMAL SCORE
N GONORRHOEA DNA VAG QL NAA+PROBE: NEGATIVE
T VAGINALIS DNA VAG QL NAA+PROBE: NEGATIVE

## (undated) DIAGNOSIS — N92.1 EXCESSIVE AND FREQUENT MENSTRUATION WITH IRREGULAR CYCLE: ICD-10-CM